# Patient Record
Sex: FEMALE | Race: BLACK OR AFRICAN AMERICAN | NOT HISPANIC OR LATINO | Employment: OTHER | URBAN - METROPOLITAN AREA
[De-identification: names, ages, dates, MRNs, and addresses within clinical notes are randomized per-mention and may not be internally consistent; named-entity substitution may affect disease eponyms.]

---

## 2017-02-06 ENCOUNTER — PATIENT OUTREACH (OUTPATIENT)
Dept: ADMINISTRATIVE | Facility: HOSPITAL | Age: 52
End: 2017-02-06

## 2017-05-02 RX ORDER — NAPROXEN SODIUM 220 MG
TABLET ORAL
Qty: 90 EACH | Refills: 1 | Status: SHIPPED | OUTPATIENT
Start: 2017-05-02 | End: 2022-07-22

## 2017-07-10 DIAGNOSIS — Z12.11 COLON CANCER SCREENING: ICD-10-CM

## 2017-08-01 ENCOUNTER — OFFICE VISIT (OUTPATIENT)
Dept: ENDOCRINOLOGY | Facility: CLINIC | Age: 52
End: 2017-08-01
Payer: MEDICARE

## 2017-08-01 VITALS
BODY MASS INDEX: 25.97 KG/M2 | HEIGHT: 62 IN | WEIGHT: 141.13 LBS | DIASTOLIC BLOOD PRESSURE: 90 MMHG | SYSTOLIC BLOOD PRESSURE: 140 MMHG | HEART RATE: 86 BPM

## 2017-08-01 DIAGNOSIS — E78.2 MIXED HYPERLIPIDEMIA: Chronic | ICD-10-CM

## 2017-08-01 DIAGNOSIS — I10 ESSENTIAL HYPERTENSION, BENIGN: Chronic | ICD-10-CM

## 2017-08-01 LAB
CREAT UR-MCNC: 133 MG/DL
MICROALBUMIN UR DL<=1MG/L-MCNC: 3806 UG/ML
MICROALBUMIN/CREATININE RATIO: 2861.7 UG/MG

## 2017-08-01 PROCEDURE — 99214 OFFICE O/P EST MOD 30 MIN: CPT | Mod: GC,S$GLB,, | Performed by: INTERNAL MEDICINE

## 2017-08-01 PROCEDURE — 99999 PR PBB SHADOW E&M-EST. PATIENT-LVL IV: CPT | Mod: PBBFAC,GC,, | Performed by: INTERNAL MEDICINE

## 2017-08-01 PROCEDURE — 82570 ASSAY OF URINE CREATININE: CPT

## 2017-08-01 RX ORDER — INSULIN LISPRO 100 [IU]/ML
INJECTION, SOLUTION INTRAVENOUS; SUBCUTANEOUS
Qty: 30 ML | Refills: 11 | Status: SHIPPED | OUTPATIENT
Start: 2017-08-01 | End: 2018-07-13 | Stop reason: SDUPTHER

## 2017-08-01 NOTE — PROGRESS NOTES
"Subjective:      Patient ID: Estefani Sutherland is a 52 y.o. female.    Chief Complaint: Diabetes Mellitus     is presenting for new evaluation and treatment of dm type 2.    Other med history includes stroke in May 2015, hyperlipidemia  Diagnosed with DM type 2 at age 27.  Currently on levemir 50 units 1-2x per day. Mostly 1x per day. Reports that she takes more insulin if her blood is elevated.  Dm complications include retinopathy, neuropathy.  Has had laser surgery.    htn -- currently takes lisinopril, amlodipine  hld - takes pravastatin 40mg    Checks bg 2-3x per day  Am fastin's  Before bed: 300's    Lives with   No formal exercise    Dad with diabetes. Didn't develop diabetes until age 79  Aunt with diabetes    Review of Systems   Constitutional: Negative for unexpected weight change.   Eyes: Positive for visual disturbance.   Respiratory: Negative for shortness of breath.    Cardiovascular: Negative for chest pain.   Gastrointestinal: Negative for abdominal pain.   Musculoskeletal: Negative for arthralgias.   Skin: Negative for wound.   Neurological: Positive for numbness. Negative for headaches.   Hematological: Does not bruise/bleed easily.   Psychiatric/Behavioral: Negative for sleep disturbance.       Objective:     BP (!) 140/90 (BP Location: Left arm, Patient Position: Sitting, BP Method: Manual)   Pulse 86   Ht 5' 2" (1.575 m)   Wt 64 kg (141 lb 1.5 oz)   BMI 25.81 kg/m²      Physical Exam   Constitutional: She appears well-developed.   HENT:   Right Ear: External ear normal.   Left Ear: External ear normal.   Nose: Nose normal.   Hearing normal  Dentition good    Neck: No tracheal deviation present. No thyromegaly present.   Cardiovascular: Normal rate.    No murmur heard.  Pulmonary/Chest: Effort normal and breath sounds normal.   Abdominal: Soft. There is no tenderness. No hernia.   Musculoskeletal: She exhibits no edema.        Right foot: There is no deformity.        Left " foot: There is no deformity.   Gait normal  No clubbing or cyanosis noted   Feet:   Right Foot:   Skin Integrity: Negative for ulcer.   Left Foot:   Skin Integrity: Negative for ulcer.   Neurological: No cranial nerve deficit.   Feet - decreased sensation intact to vibration and monofilament     Skin: No rash noted.   No nodules  injection sites are ok. No lipo hypertropthy or atrophy     Psychiatric: She has a normal mood and affect. Judgment normal.   Vitals reviewed.      Assessment:     1. DM type 2, uncontrolled, with neuropathy    2. Essential hypertension, benign    3. Mixed hyperlipidemia        Plan:   1. Will try to order v-go 30, 6 units (3 clicks with meals). Plan to write through [facility] in obtaining approval. Diabetes education to discuss how to use v-go. Check urine microalbumin/creatinine ratio today. Obtain outside recent July labs and office notes from [facility].  2. bp at goal. Continue lisinopril 10mg  3. Continue pravastatin 40mg    Discussed with Dr. Steve  Contact [phone]  Follow-up with Endocrine NP in 6 weeks    Bhupinder Gallegos MD

## 2017-08-02 ENCOUNTER — OFFICE VISIT (OUTPATIENT)
Dept: PODIATRY | Facility: CLINIC | Age: 52
End: 2017-08-02
Payer: MEDICARE

## 2017-08-02 VITALS
DIASTOLIC BLOOD PRESSURE: 94 MMHG | HEART RATE: 86 BPM | WEIGHT: 141 LBS | HEIGHT: 62 IN | SYSTOLIC BLOOD PRESSURE: 158 MMHG | BODY MASS INDEX: 25.95 KG/M2

## 2017-08-02 DIAGNOSIS — L84 CORN OR CALLUS: ICD-10-CM

## 2017-08-02 DIAGNOSIS — L60.2 ONYCHAUXIS: ICD-10-CM

## 2017-08-02 DIAGNOSIS — E11.42 DIABETIC POLYNEUROPATHY ASSOCIATED WITH TYPE 2 DIABETES MELLITUS: Primary | Chronic | ICD-10-CM

## 2017-08-02 PROCEDURE — 99999 PR PBB SHADOW E&M-EST. PATIENT-LVL III: CPT | Mod: PBBFAC,,, | Performed by: PODIATRIST

## 2017-08-02 PROCEDURE — 99202 OFFICE O/P NEW SF 15 MIN: CPT | Mod: 25,S$GLB,, | Performed by: PODIATRIST

## 2017-08-02 PROCEDURE — 11055 PARING/CUTG B9 HYPRKER LES 1: CPT | Mod: Q9,S$GLB,, | Performed by: PODIATRIST

## 2017-08-02 PROCEDURE — 11719 TRIM NAIL(S) ANY NUMBER: CPT | Mod: 59,Q9,S$GLB, | Performed by: PODIATRIST

## 2017-08-02 NOTE — PROGRESS NOTES
Ms. Sutherland is a 52 year old woman with type 2 diabetes that is uncontrolled on MDI, would like to make administration of insulin as easy as possible. Considering v-go pump, but need to see recent blood tests to be able to decide insulin needs given possibility of CKD.     Have explained this at length to Ms. Sutherland.     I, Selam Steve, have personally taken the history and physical exam and I agree with Dr. Gallegos's assessment and plan

## 2017-08-02 NOTE — PATIENT INSTRUCTIONS
1. Remember the importance of good nutrition and blood sugar control to help prevent foot complications of diabetes and see your internist at least ever six months.     2. Always wear proper shoe gear.     3. Inspect your feet daily.     4. Never put sharp instruments to your feet.     5. Be faithful about your routine podiatric nail visits every 2 - 3 months.     6. Always call the clinic immediately if you notice something on your feet that is not normal such as a blister, wound, or foreign object stuck in your foot.

## 2017-08-02 NOTE — LETTER
August 2, 2017      Meg Mcdaniels MD  [address] [facility] [address]           [facility] - Podiatry  [address]  [facility] [address]  Phone: [phone]          Patient: Estefani Sutherland   MR Number: [id number]   YOB: 1965   Date of Visit: 8/2/2017       Dear Dr. Meg Mcdaniels:    Thank you for referring Estefani Sutherland to me for evaluation. Attached you will find relevant portions of my assessment and plan of care.    If you have questions, please do not hesitate to call me. I look forward to following Estefani Sutherland along with you.    Sincerely,    Aliya Garcia DPM    Enclosure  CC:  No Recipients    If you would like to receive this communication electronically, please contact [email or web address] or [phone] to request more information on [employer] Link access.    For providers and/or their staff who would like to refer a patient to [facility], please contact us through our one-stop-shop provider referral line, [employer] Jan, at [phone].    If you feel you have received this communication in error or would no longer like to receive these types of communications, please e-mail [email or web address]

## 2017-08-02 NOTE — PROGRESS NOTES
Subjective:      Patient ID: Estefani Sutherland is a 52 y.o. female.    Chief Complaint: Diabetes Mellitus ( queta Gallegos 8/1/17); Diabetic Foot Exam; Numbness; and Callouses (rt 2nd toe)    Estefani is a 52 y.o. female who presents to the clinic for evaluation and treatment of high risk feet. Estefani has a past medical history of Diabetes mellitus type II; Diabetic neuropathy; Diabetic retinopathy; Diabetic retinopathy associated with type 2 diabetes mellitus; Hyperlipidemia; Hypertension; Neuromuscular disorder; and Stroke. The patient's chief complaint is long, thick toenails and callus R 2nd toe. This patient has documented high risk feet requiring routine maintenance secondary to diabetes mellitis and those secondary complications of diabetes, as mentioned.    PCP: Roverto Armendariz MD    Date Last Seen by PCP:   Chief Complaint   Patient presents with    Diabetes Mellitus      queta Gallegos 8/1/17    Diabetic Foot Exam    Numbness    Callouses     rt 2nd toe        Current shoe gear:  Affected Foot: Casual shoes     Unaffected Foot: Casual shoes    Past Medical History:   Diagnosis Date    Diabetes mellitus type II     Diabetic neuropathy     Diabetic retinopathy     Diabetic retinopathy associated with type 2 diabetes mellitus     Hyperlipidemia     Hypertension     Neuromuscular disorder     neuropathy - feet and hands    Stroke     Ischemic R MCA 5/2015      Past Surgical History:   Procedure Laterality Date    BREAST SURGERY      breast reduction    HYSTERECTOMY      BSO      Family History   Problem Relation Age of Onset    Hypertension Mother     Diabetes Father     Hypertension Father      Social History     Social History    Marital status:      Spouse name: N/A    Number of children: N/A    Years of education: N/A     Social History Main Topics    Smoking status: Never Smoker    Smokeless tobacco: None    Alcohol use No    Drug use: No    Sexual activity: Yes      Partners: Male     Other Topics Concern    None     Social History Narrative    None         Hemoglobin A1C   Date Value Ref Range Status   09/25/2015 11.3 (H) 4.5 - 6.2 % Final   05/29/2015 12.3 (H) 4.5 - 6.2 % Final   04/15/2014 13.8 (H) 4.5 - 6.2 % Final       Review of Systems   Eyes:        Visual impairment.   Cardiovascular: Negative for chest pain.   Respiratory: Negative for shortness of breath.    Musculoskeletal: Positive for back pain (upper back). Negative for joint pain and joint swelling.   Neurological: Positive for numbness (in feet) and paresthesias.   Psychiatric/Behavioral: Negative for altered mental status.           Objective:      Physical Exam   Constitutional: She is oriented to person, place, and time. She appears well-developed and well-nourished. No distress.   Cardiovascular: Normal rate and intact distal pulses.    Dorsalis pedis pulses are palpable Bilateral and posterior tibial pulses are non-palpable Bilateral. Toes are cool to touch. Feet are warm proximally.There is decreased digital hair. Skin is atrophic, slightly hyperpigmented, and mildly edematous.   Musculoskeletal: Normal range of motion. She exhibits edema (mild in feet). She exhibits no tenderness.   Neurological: She is alert and oriented to person, place, and time. She exhibits normal muscle tone.   [place]-Donell 5.07 monofilamant testing is absent on toes Hipolito feet. Sharp/dull sensation absent on toes Bilaterally.    Skin: Skin is warm and dry. No rash noted. She is not diaphoretic. No erythema. No pallor.   Toenails 1-5 bilaterally are elongated by 2-3 mm, thickened by 1-2 mm, mildly discolored/yellowed nails.    Hyperkeratotic lesions at the following locations: R second toe     Psychiatric: She has a normal mood and affect. Her behavior is normal. Judgment and thought content normal.   Nursing note and vitals reviewed.            Assessment:       Encounter Diagnoses   Name Primary?    Diabetic polyneuropathy  associated with type 2 diabetes mellitus Yes    Onychauxis     Corn or callus    This patient has documented high risk feet requiring routine maintenance secondary to diabetes mellitis and those secondary complications of diabetes, as mentioned.        Plan:       Estefani was seen today for diabetes mellitus, diabetic foot exam, numbness and callouses.    Diagnoses and all orders for this visit:    Diabetic polyneuropathy associated with type 2 diabetes mellitus  -     DIABETIC SHOES FOR HOME USE    Onychauxis    Corn or callus    - Patient was given written and verbal instructions regarding foot condition.  I counseled the patient on her conditions, their implications and medical management.    - Shoe inspection. Diabetic Foot Education. Patient reminded of the importance of good nutrition and blood sugar control to help prevent podiatric complications of diabetes. Patient instructed on proper foot hygeine. We discussed wearing proper shoe gear, daily foot inspections, never walking without protective shoe gear, never putting sharp instruments to feet, routine podiatric nail visits every 2-3 months.      - With patient's permission, nails were aggressively reduced and trimmed x 10 to their soft tissue attachment mechanically with nippers, removing all offending nail and debris. Utilizing a #15 scalpel, I trimmed the corns and calluses at the following locations: R second toe. Patient tolerated well and no blood was drawn.  Patient relates relief following the procedure. She will continue to monitor the areas daily, inspect her feet, wear protective shoe gear when ambulatory, moisturizer to maintain skin integrity and follow in this office in approximately 2-3 months, sooner p.r.n.

## 2017-08-04 DIAGNOSIS — Z12.11 COLON CANCER SCREENING: ICD-10-CM

## 2017-08-05 ENCOUNTER — TELEPHONE (OUTPATIENT)
Dept: ENDOCRINOLOGY | Facility: CLINIC | Age: 52
End: 2017-08-05

## 2017-09-22 ENCOUNTER — TELEPHONE (OUTPATIENT)
Dept: INTERNAL MEDICINE | Facility: CLINIC | Age: 52
End: 2017-09-22

## 2017-10-11 RX ORDER — NAPROXEN SODIUM 220 MG
TABLET ORAL
Qty: 90 EACH | Refills: 0 | OUTPATIENT
Start: 2017-10-11

## 2017-10-30 ENCOUNTER — TELEPHONE (OUTPATIENT)
Dept: ENDOCRINOLOGY | Facility: CLINIC | Age: 52
End: 2017-10-30

## 2017-10-30 NOTE — TELEPHONE ENCOUNTER
----- Message from Lauren Wolf sent at 10/30/2017 10:05 AM CDT -----  Contact: patient  Patient needs to speak with the nurse about a Diabetic education class, patient insists she has an appointment 11//07/17.  Please call patient to discuss at [phone]

## 2017-11-01 ENCOUNTER — OFFICE VISIT (OUTPATIENT)
Dept: PODIATRY | Facility: CLINIC | Age: 52
End: 2017-11-01
Payer: MEDICARE

## 2017-11-01 VITALS
HEART RATE: 88 BPM | DIASTOLIC BLOOD PRESSURE: 92 MMHG | HEIGHT: 62 IN | SYSTOLIC BLOOD PRESSURE: 143 MMHG | BODY MASS INDEX: 26.29 KG/M2 | WEIGHT: 142.88 LBS

## 2017-11-01 DIAGNOSIS — L84 CORN OR CALLUS: ICD-10-CM

## 2017-11-01 DIAGNOSIS — E11.42 DIABETIC POLYNEUROPATHY ASSOCIATED WITH TYPE 2 DIABETES MELLITUS: Primary | ICD-10-CM

## 2017-11-01 DIAGNOSIS — B35.1 DERMATOPHYTOSIS OF NAIL: ICD-10-CM

## 2017-11-01 PROCEDURE — 11721 DEBRIDE NAIL 6 OR MORE: CPT | Mod: 59,Q9,S$GLB, | Performed by: PODIATRIST

## 2017-11-01 PROCEDURE — 99214 OFFICE O/P EST MOD 30 MIN: CPT | Mod: 25,S$GLB,, | Performed by: PODIATRIST

## 2017-11-01 PROCEDURE — 99999 PR PBB SHADOW E&M-EST. PATIENT-LVL III: CPT | Mod: PBBFAC,,, | Performed by: PODIATRIST

## 2017-11-01 PROCEDURE — 11055 PARING/CUTG B9 HYPRKER LES 1: CPT | Mod: Q9,S$GLB,, | Performed by: PODIATRIST

## 2017-11-01 RX ORDER — LISINOPRIL 40 MG/1
TABLET ORAL
Refills: 3 | COMMUNITY
Start: 2017-08-06 | End: 2019-08-19

## 2017-11-01 RX ORDER — FLUCONAZOLE 150 MG/1
TABLET ORAL
Refills: 0 | COMMUNITY
Start: 2017-07-29

## 2017-11-01 RX ORDER — LISINOPRIL AND HYDROCHLOROTHIAZIDE 12.5; 2 MG/1; MG/1
TABLET ORAL
Refills: 0 | COMMUNITY
Start: 2017-10-23 | End: 2019-08-19

## 2017-11-01 RX ORDER — INSULIN DETEMIR 100 [IU]/ML
INJECTION, SOLUTION SUBCUTANEOUS
Refills: 0 | COMMUNITY
Start: 2017-09-27 | End: 2019-08-19

## 2017-11-01 RX ORDER — METFORMIN HYDROCHLORIDE 500 MG/1
TABLET, EXTENDED RELEASE ORAL
Refills: 5 | COMMUNITY
Start: 2017-08-24 | End: 2019-08-19

## 2017-11-01 RX ORDER — INSULIN DETEMIR 100 [IU]/ML
INJECTION, SOLUTION SUBCUTANEOUS
Refills: 5 | COMMUNITY
Start: 2017-10-18 | End: 2019-08-19

## 2017-11-01 RX ORDER — ATORVASTATIN CALCIUM 40 MG/1
TABLET, FILM COATED ORAL
Refills: 5 | COMMUNITY
Start: 2017-10-23 | End: 2022-08-19

## 2017-11-01 RX ORDER — AMLODIPINE BESYLATE 10 MG/1
TABLET ORAL
Refills: 3 | COMMUNITY
Start: 2017-10-16

## 2017-11-01 NOTE — PROGRESS NOTES
Subjective:      Patient ID: Estefani Sutherland is a 52 y.o. female.    Chief Complaint: PCP (Kala 0925/2015); Diabetic Foot Exam; and Nail Care    Estefani is a 52 y.o. female who presents to the clinic for evaluation and treatment of high risk feet. Estefani has a past medical history of Diabetes mellitus type II; Diabetic neuropathy; Diabetic retinopathy; Diabetic retinopathy associated with type 2 diabetes mellitus; Hyperlipidemia; Hypertension; Neuromuscular disorder; and Stroke. The patient's chief complaint is long, thick toenails and callus sub 5th metatarsal head left foot.   This patient has documented high risk feet requiring routine maintenance secondary to diabetes mellitis and those secondary complications of diabetes, as mentioned.    She is wearing [employer]ox shoes today.      PCP: Roverto Armendariz MD    Date Last Seen by PCP:   Chief Complaint   Patient presents with    PCP     [facility]  Dr. Mcdaniels  Sept 10, 2017;   Dr. Steve Endocrinology 8/1/17    Diabetic Foot Exam    Nail Care          Past Medical History:   Diagnosis Date    Diabetes mellitus type II     Diabetic neuropathy     Diabetic retinopathy     Diabetic retinopathy associated with type 2 diabetes mellitus     Hyperlipidemia     Hypertension     Neuromuscular disorder     neuropathy - feet and hands    Stroke     Ischemic R MCA 5/2015      Past Surgical History:   Procedure Laterality Date    BREAST SURGERY      breast reduction    HYSTERECTOMY      BSO        Current Outpatient Prescriptions on File Prior to Visit   Medication Sig Dispense Refill    amlodipine (NORVASC) 5 MG tablet Take 1 tablet (5 mg total) by mouth once daily. (Patient taking differently: Take 10 mg by mouth once daily. ) 90 tablet 0    aspirin (ECOTRIN) 81 MG EC tablet Take 81 mg by mouth once daily.      blood sugar diagnostic Strp Check sugar before meals tid and as directed 300 each 3    fluticasone (FLONASE) 50 mcg/actuation nasal spray 2 sprays by  "Each Nare route once daily. 16 g 1    insulin lispro (HUMALOG) 100 unit/mL injection Inject subcutaneously daily per V-go. Take 6 units (3 clicks) with meals 30 mL 11    insulin needles, disposable, 30 x 1/3 " Ndle Use as directed tid 100 each 5    insulin syringe-needle U-100 0.5 mL 31 gauge x 5/16 Syrg USE TWICE DAILY AS DIRECTED 90 each 1    lisinopril 10 MG tablet Take 2 tablets (20 mg total) by mouth once daily. (Patient taking differently: Take 40 mg by mouth once daily. ) 180 tablet 0    MICROLET LANCET [facility] CHECK SUGAR BEFORE MEALS THREE TIMES DAILY AND AS DIRECTED 300 each 5    needle, disp, 21 G (BD REGULAR BEVEL NEEDLES) 21 x 1 " Ndle 1 Stick by Misc.(Non-Drug; Combo Route) route as directed. 100 each 0    pregabalin (LYRICA) 50 MG capsule Take 1 capsule (50 mg total) by mouth once daily. 90 capsule 0    sub-q insulin device, 30 unit [place] 1 each by Misc.(Non-Drug; Combo Route) route once daily. 30 Device 11    blood-glucose meter kit Use as instructed 1 each 0    [DISCONTINUED] pravastatin (PRAVACHOL) 40 MG tablet Take 1 tablet (40 mg total) by mouth once daily. 90 tablet 0     No current facility-administered medications on file prior to visit.            Family History   Problem Relation Age of Onset    Hypertension Mother     Diabetes Father     Hypertension Father      Social History     Social History    Marital status:      Spouse name: N/A    Number of children: N/A    Years of education: N/A     Social History Main Topics    Smoking status: Never Smoker    Smokeless tobacco: Not on file    Alcohol use No    Drug use: No    Sexual activity: Yes     Partners: Male     Other Topics Concern    Not on file     Social History Narrative    No narrative on file       Review of patient's allergies indicates:  No Known Allergies      Hemoglobin A1C   Date Value Ref Range Status   09/25/2015 11.3 (H) 4.5 - 6.2 % Final   05/29/2015 12.3 (H) 4.5 - 6.2 % Final   04/15/2014 13.8 (H) " "4.5 - 6.2 % Final       Review of Systems   Eyes:        Visual impairment.   Cardiovascular: Negative for chest pain.   Respiratory: Negative for shortness of breath.    Musculoskeletal: Positive for back pain (upper back). Negative for joint pain and joint swelling.   Neurological: Positive for numbness (in feet) and paresthesias.   Psychiatric/Behavioral: Negative for altered mental status.           Objective:       Vitals:    11/01/17 0727   BP: (!) 143/92   Pulse: 88   Weight: 64.8 kg (142 lb 14.4 oz)   Height: 5' 2" (1.575 m)           Physical Exam   Constitutional: She is oriented to person, place, and time. She appears well-developed and well-nourished. No distress.   Cardiovascular: Normal rate and intact distal pulses.    Dorsalis pedis pulses are palpable Bilateral and posterior tibial pulses are non-palpable Bilateral. Toes are cool to touch. Feet are warm proximally.There is decreased digital hair. Skin is atrophic, slightly hyperpigmented, and mildly edematous.   Musculoskeletal: Normal range of motion. She exhibits edema (mild in feet). She exhibits no tenderness.   Neurological: She is alert and oriented to person, place, and time. She exhibits normal muscle tone.   [place]ell 5.07 monofilamant testing is absent on toes Hipolito feet. Sharp/dull sensation absent on toes Bilaterally.    Skin: Skin is warm and dry. No rash noted. She is not diaphoretic. No erythema. No pallor.   Toenails 1-5 bilaterally are elongated by 2-3 mm, thickened by 1mm, mildly discolored/yellowed nails.    Hyperkeratotic lesions at the following locations: sub 5th metatarsal head left foot.  No wounds.      Psychiatric: She has a normal mood and affect. Her behavior is normal. Judgment and thought content normal.   Nursing note and vitals reviewed.            Assessment:       Encounter Diagnoses   Name Primary?    Diabetic polyneuropathy associated with type 2 diabetes mellitus Yes    Corn or callus     Dermatophytosis " of nail    This patient has documented high risk feet requiring routine maintenance secondary to diabetes mellitis and those secondary complications of diabetes, as mentioned.        Plan:       Estefani was seen today for pcp, diabetic foot exam and nail care.    Diagnoses and all orders for this visit:    Diabetic polyneuropathy associated with type 2 diabetes mellitus  -     DIABETIC SHOES FOR HOME USE  -     menthol (BIOFREEZE, MENTHOL,) 4 % Gel; Apply 1 application topically once daily. To painful area on the feet.    Corn or callus    Dermatophytosis of nail    Other orders  -     Foot Care    - Patient was given written and verbal instructions regarding foot condition.  I counseled the patient on her conditions, their implications and medical management.    - Shoe inspection. Diabetic Foot Education. Patient reminded of the importance of good nutrition and blood sugar control to help prevent podiatric complications of diabetes. Patient instructed on proper foot hygeine. We discussed wearing proper shoe gear, daily foot inspections, never walking without protective shoe gear, never putting sharp instruments to feet, routine podiatric nail visits every 2-3 months.     Routine Foot Care  Date/Time: 11/1/2017 7:47 AM  Performed by: SASHA MURILLO  Authorized by: SASHA MURILLO     Consent Done?:  Yes (Verbal)  Hyperkeratotic Skin Lesions?: Yes    Number of trimmed lesions:  1  Location(s):  Left 5th Metatarsal Head    Nail Care Type:  Debride  Location(s): All  (Left 1st Toe, Left 3rd Toe, Left 2nd Toe, Left 4th Toe, Left 5th Toe, Right 1st Toe, Right 2nd Toe, Right 3rd Toe, Right 4th Toe and Right 5th Toe)  Patient tolerance:  Patient tolerated the procedure well with no immediate complications     With patient's permission, the toenails mentioned above were aggressively reduced and debrided using a nail nipper, removing all offending nail and debris. Utilizing a #15 scalpel, I trimmed the corns and calluses at the  above mentioned location.      The patient will continue to monitor the areas daily, inspect the feet, wear protective shoe gear when ambulatory, and moisturizer to maintain skin integrity.       Return in about 3 months (around 2/1/2018) for foot care, or sooner if concerned.

## 2017-11-10 ENCOUNTER — TELEPHONE (OUTPATIENT)
Dept: DIABETES | Facility: CLINIC | Age: 52
End: 2017-11-10

## 2017-11-10 NOTE — TELEPHONE ENCOUNTER
Received message that pt wants to reschedule Diabetes education.  Left message for pt to return call.  Contact information provided.

## 2017-12-06 RX ORDER — INSULIN DETEMIR 100 [IU]/ML
INJECTION, SOLUTION SUBCUTANEOUS
Qty: 20 ML | Refills: 0 | OUTPATIENT
Start: 2017-12-06

## 2017-12-15 ENCOUNTER — PATIENT OUTREACH (OUTPATIENT)
Dept: DIABETES | Facility: CLINIC | Age: 52
End: 2017-12-15

## 2017-12-15 ENCOUNTER — TELEPHONE (OUTPATIENT)
Dept: ENDOCRINOLOGY | Facility: CLINIC | Age: 52
End: 2017-12-15

## 2017-12-15 ENCOUNTER — TELEPHONE (OUTPATIENT)
Dept: DIABETES | Facility: CLINIC | Age: 52
End: 2017-12-15

## 2017-12-15 NOTE — TELEPHONE ENCOUNTER
----- Message from Yarelis Pruett RD sent at 12/15/2017 10:49 AM CST -----  Hi!    I received a message from pre-service that the diabetes education appt for VGo training this Monday, 12/18 was denied, so I called and talked with Ms. Koko. She contacted her insurance and called me back stating insurance instructed her they need MD prior authorization and requested Dr. Gallegos call them at [phone] to complete the auth. Please advise.     Thank you!  Yarelis

## 2017-12-18 ENCOUNTER — CLINICAL SUPPORT (OUTPATIENT)
Dept: DIABETES | Facility: CLINIC | Age: 52
End: 2017-12-18
Payer: MEDICARE

## 2017-12-18 PROCEDURE — G0108 DIAB MANAGE TRN  PER INDIV: HCPCS | Mod: S$GLB,,, | Performed by: DIETITIAN, REGISTERED

## 2017-12-18 NOTE — PROGRESS NOTES
Diabetes Education  Author: Yarelis Pruett RD  Date: 12/18/2017    Diabetes Education Visit  Diabetes Education Record Assessment/Progress: Initial (Here for VGo training)    Diabetes Type  Diabetes Type : Type II    Diabetes History  Diabetes Diagnosis: >10 years    Nutrition  Meal Planning: 3 meals per day, snacks between meal    Monitoring   Self Monitoring : SMBG ~2 times daily  Blood Glucose Logs: No    Exercise   Exercise Type:  (has treadmill at home - reports when BG is high, she runs on it for 3 minutes)    Current Diabetes Treatment   Current Treatment: Insulin (Metformin XR 1000mg BID. Has been on Levemir 50 units BID (often only taking once daily). Now changing to VG0 30 with 3 clicks before each meal)    Social History  Preferred Learning Method: Face to Face, Hands On  Primary Support: Self, Spouse ( attended visit today)            DDS-2 Score  ( > 3 = SIGNIFICANT DISTRESS): 2                   Barriers to Change  Barriers to Change: None  Learning Challenges : Vision  Vision - further explanation: blind (legally blind - has partial vision with right eye, also reports having cataracts removed in about 6 weeks)    Readiness to Learn   Readiness to Learn : Acceptance    Cultural Influences  Cultural Influences: No    Diabetes Education Assessment/Progress    Diabetes Disease Process (diabetes disease process and treatment options): Discussion, Individual Session, Demonstrates Understanding/Competency(verbalizes/demonstrates)    Nutrition (Incorporating nutritional management into one's lifestyle): Discussion, Individual Session, Needs Review, Instructed, Written Materials Provided (Verbalized would like review of nutrition. Discussed sources of CHO, serving sizes, and plate method of meal planning. Reviewed portion control with food models. Encouraged 3 meals daily with 30-45g CHO/meal and if hungry between meals, 0-5g CHO snack.)    Physical Activity (incorporating physical activity into one's  "lifestyle): Discussion, Individual Session, Needs Review, Written Materials Provided, Instructed (She verbalized not trying to lose wt but wants to increase muscle tone. Discussed benefits of physical activity. She has treadmill at home but does not use it regularly. Encouraged starting with 10 min and working up to 30 min 5 days weekly. )    Medications (states correct name, dose, onset, peak, duration, side effects & timing of meds): Demonstration, Discussion, Return Demonstration, Needs Instruction, Individual Session, Instructed, Written Materials Provided (Provided VGo training. Pt had some difficulty at first - became tearful stating "I can't see how to do it." Provided reassurance that we can do multiple demonstrations with her feeling her way through the steps until she is comfortable with it. She successfully performed return demo with a demo device and her first device in office today.  observed and assisted with filling and placing Vgo.     Patient is here for instructions on use of the V-Go 30 which will provide 30 units of basal insulin given over 24 hours (1.25 units/hr) and up to 36 units of on-demand bolus dosing in 2-Unit increments. Patient will be giving 6 of Novolog at each meal (3 clicks). Patient had been instructed to hold long-acting insulin. Patient also advised will discontinue taking insulin pens and only use the Novolog vial with the V-Go system.     Patient was instructed on the following:    Insert vial into EZ Fill and leave in place until vial is empty   Remove plug and insert V-Go    Draw insulin into EZ Fill and fill V-Go with insulin (check that V-Go is full of insulin)    Remove V-GO and clean and replace plug (advised to wipe the circular opening with an alcohol swab before replacing)    Select site for V-Go (site selection reviewed) and prepare infusion site with aseptic technique    Remove button cover and adhesive liner    Attach V-Go to site selected and insert " needle by pushing needle button in to activate basal rate    Instructed patient on how to activate the bolus ready button and to push the bolus delivery button into the V-Go to deliver 2 units of insulin (1 click = 2 units). Patient advised that once all 36 units of the on-demand bolus have been given, the bolus buttons will lock, but the basal insulin will continue for the remainder of the 24 hours    To remove, release needle by sliding and pressing the needle release button    Remove the V-Go and discard (the V-Go is 100% disposable)   Patient instructed that the V-Go needs to be changed every 24 hours.    Patient was able to perform successful return demonstration of all.     General precautions reviewed with the patient:    V-Go needs to be removed before having an X-ray, MRI or CT scan (or any similar test or procedure). Replace with a new V-Go after the test or procedure is completed. Patient also advised to check with provider before any type of surgical procedure to see if the V-Go can be worn.    Patient advised to monitor BG 4 times a day (pre-meals and at HS)    Patient advised to keep back up insulin pens (non-) should a problem develop with the V-Go. Patient also advised should have directions from endocrine provider regarding insulin doses should need arise to switch back to insulin pens temporarily.    The V-Go should not be exposed to direct sunlight, hot tub, whirlpool or sauna use (replace with a new filled V-Go afterward)    Check that the V-Go is securely in place during and after periods of increased physical activity, exposure to water or gone under water to the depth of 3 feet, 3 inches (the V-Go can go under water and continue to work safely)    Reviewed s/s and tx of hypoglycemia    All of patient's questions and concerns were addressed. Patient instructed to keep a BG log (log sheets provided) and send BG readings to endocrine provider in 1 week for review. Phone number  "was provided and patient advised to call with any questions or concerns. )    Monitoring (monitoring blood glucose/other parameters & using results): Discussion, Needs Review, Individual Session, Instructed, Written Materials Provided (Reviewed goal BG readings, SMBG 4 times daily AC/HS, keeping log and send log in 1 week for review. Provided addressed envelops for sending in logs.)    Acute Complications (preventing, detecting, and treating acute complications): Discussion, Needs Review, Individual Session, Instructed, Written Materials Provided (Reviewed s/s and proper treatment of hypoglycemia with "rule of 15.")    Chronic Complications (preventing, detecting, and treating chronic complications): Discussion, Individual Session, Demonstrates Understanding/Competency (verbalizes/demonstrates) (Reviewed standards of care. )    Cognitive (knowledge of self-management skills, functional health literacy): Discussion, Individual Session    Psychosocial (emotional response to diabetes): Discussion, Individual Session    Diabetes Distress and Support Systems: Discussion, Individual Session    Behavioral (readiness for change, lifestyle practices, self-care behaviors): Discussion, Individual Session    Goals  Patient has selected/evaluated goals during today's session: Yes, selected  Monitoring: Set (Will send log in 1 week. )  Start Date: 12/18/17  Target Date: 03/18/18  Medications: Set (Will change VGo same time daily and provided clicks before each meal. )  Start Date: 12/18/17  Target Date: 03/18/18         Diabetes Care Plan/Intervention  Education Plan/Intervention: Other (Pt to send log in mail in 1 week. Unable to schedule follow-up DE appt at this time d/t insurance denial. Provided contact information and pt will call when ready. )    Diabetes Meal Plan  Carbohydrate Per Meal: 30-45g    Education Units of Time   Time Spent: 60 min      Health Maintenance Topics with due status: Not Due       Topic Last " Completion Date    Pneumococcal PPSV23 (Medium Risk) 04/16/2013    Foot Exam 09/12/2017     Health Maintenance Due   Topic Date Due    TETANUS VACCINE  04/12/1982    Colonoscopy  04/12/2014    Mammogram  03/28/2015    Hemoglobin A1c  03/25/2016    Lipid Panel  05/29/2016    Eye Exam  10/19/2016    Influenza Vaccine  08/01/2017

## 2017-12-18 NOTE — Clinical Note
Arnold Gallegos,   MsLillian Koko successfully started VGo 30 today. I provided her log sheets and addressed envelops to mail in 1 week.  Thank you! Yarelis

## 2018-04-19 ENCOUNTER — TELEPHONE (OUTPATIENT)
Dept: ENDOCRINOLOGY | Facility: CLINIC | Age: 53
End: 2018-04-19

## 2018-04-19 NOTE — TELEPHONE ENCOUNTER
----- Message from Alexandra Hernández sent at 4/19/2018  9:56 AM CDT -----  Contact: Walter'  Would like the- Humolog to be re-written as 3 vials(each) for 30 days.          ..  [facility] [address] [place] [address] [address] [facility] [address] [facility] [address]  [address] [facility] [address]  [facility] [address]  Phone: [phone] Fax: [phone]

## 2018-07-13 RX ORDER — INSULIN LISPRO 100 [IU]/ML
INJECTION, SOLUTION INTRAVENOUS; SUBCUTANEOUS
Qty: 30 ML | Refills: 3 | Status: SHIPPED | OUTPATIENT
Start: 2018-07-13 | End: 2018-09-10 | Stop reason: SDUPTHER

## 2018-07-13 NOTE — TELEPHONE ENCOUNTER
----- Message from Lazara Alonzo sent at 7/13/2018  4:47 PM CDT -----  Contact: Pt     [phone]  Rx Refill/Request     Is this a Refill or New Rx:  Refill    Rx Name and Strength:  insulin lispro (HUMALOG) 100 unit/mL injection  Preferred Pharmacy with phone number:    [facility]   [phone]   Communication Preference:   Phone   Additional Information:

## 2018-09-10 RX ORDER — INSULIN LISPRO 100 [IU]/ML
INJECTION, SOLUTION INTRAVENOUS; SUBCUTANEOUS
Qty: 30 ML | Refills: 3 | Status: SHIPPED | OUTPATIENT
Start: 2018-09-10 | End: 2019-08-19 | Stop reason: SDUPTHER

## 2018-09-10 NOTE — TELEPHONE ENCOUNTER
----- Message from Lazara Alonzo sent at 9/10/2018  1:26 PM CDT -----  Contact:   Pt  [phone]  Rx Refill/Request     Is this a Refill or New Rx:    Refill     Rx Name and Strength:    HUMALOG) 100 unit/mL injection,  VGO Kit   Preferred Pharmacy with phone number:   [facility]   [address] [facility] [address] [facility] [address] [phone]    Communication Preference:  Phone   Additional Information:

## 2018-09-11 DIAGNOSIS — Z79.4 UNCONTROLLED TYPE 2 DIABETES MELLITUS WITH HYPEROSMOLAR COMA, WITH LONG-TERM CURRENT USE OF INSULIN: Primary | ICD-10-CM

## 2018-09-11 DIAGNOSIS — E11.01 UNCONTROLLED TYPE 2 DIABETES MELLITUS WITH HYPEROSMOLAR COMA, WITH LONG-TERM CURRENT USE OF INSULIN: Primary | ICD-10-CM

## 2018-09-11 NOTE — TELEPHONE ENCOUNTER
----- Message from Alexandra Hernández sent at 9/11/2018  3:25 PM CDT -----  Contact: Self  .Rx Refill/Request     Is this a Refill or New Rx:  New, needs an order for the kit  Rx Name and Strength:  VGO kit  [facility] with phone number:  [facility]  Communication Preference: [phone] Fax: [phone]  Additional Information:

## 2019-08-14 RX ORDER — INSULIN LISPRO 100 [IU]/ML
INJECTION, SOLUTION INTRAVENOUS; SUBCUTANEOUS
Qty: 30 ML | Refills: 0 | OUTPATIENT
Start: 2019-08-14

## 2019-08-15 NOTE — PROGRESS NOTES
Subjective:      Patient ID: Estefani Sutherland is a 54 y.o. female.    Chief Complaint:  Diabetes    History of Present Illness  Estefani Sutherland is here for follow up of T2DM.  Previously seen by Dr. Steve.  Last seen 17.  This is their first visit with me.      Blind in the right eye and going blind in the left.    With regards to diabetes:  Outside Labs   19  GFR 39    3/31/19  A1c 9.5    Diagnosed: 27y.o.  Known complications:  DKA -  RN +  PN -  Nephropathy +    Current regimen:  Vgo 30 - Humalog 3-3-4 clicks and 2 clicks with snacks     Clicks right when she gets her food.    Patient and her  report that they are often running out of insulin in their Vgo, which does not add up if she is clicking appropriately.      Other medications tried:  MFM    Glucose Monitor:   4 times a day testing  Log reviewed: yes oral recall  Fastin- 150  Before meals: 180-190  Before bed: 140-200    Diet/Exercise:  Eats 3 meals a day.   Snacks : sporadically   Drinks : water   Exercise - tries to stay active     Hypoglycemia:  None  Knows how to correct with 15 grams of carbs- juice, coke, or a peppermint.      Education - last visit: 17  Eye Exam: 10/5/18  Podiatry: 17    Diabetes Management Status  Statin: Taking  ACE/ARB: Taking  Screening or Prevention Patient's value Goal Complete/Controlled?   HgA1C Testing and Control   Lab Results   Component Value Date    HGBA1C 11.3 (H) 2015      Annually/Less than 8% No   Lipid profile Most Recent Lipid Panel Health Maintenance Topic Completion: Not Found Annually No   LDL control Lab Results   Component Value Date    LDLCALC 137.0 2015    Annually/Less than 100 mg/dl  No   Nephropathy screening Lab Results   Component Value Date    LABMICR 3806.0 2017     Lab Results   Component Value Date    PROTEINUA 2+ (A) 2016    Annually No   Blood pressure BP Readings from Last 1 Encounters:   19 (!) 146/84    Less than 140/90 No   Dilated  "retinal exam : 10/19/2015 Annually No   Foot exam   Most Recent Foot Exam Date: Not Found Annually No     Review of Systems   Constitutional: Negative for fatigue.   Eyes: Negative for visual disturbance.   Respiratory: Negative for shortness of breath.    Cardiovascular: Negative for chest pain.   Gastrointestinal: Negative for abdominal pain.   Musculoskeletal: Negative for arthralgias.   Skin: Negative for wound.   Neurological: Negative for headaches.   Hematological: Does not bruise/bleed easily.   Psychiatric/Behavioral: Negative for sleep disturbance.     Objective:   Physical Exam   Neck: No thyromegaly present.   Cardiovascular: Normal rate.   No edema present   Pulmonary/Chest: Effort normal.   Abdominal: Soft.   Vitals reviewed.    Visit Vitals  BP (!) 146/84   Pulse 88   Resp 20   Ht 5' 2" (1.575 m)   Wt 69 kg (152 lb 3.6 oz)   BMI 27.84 kg/m²     Appropriate footwear, foot exam deferred, per patient request.  Injection sites are okay. No lipo hypertropthy or atrophy.    Body mass index is 27.84 kg/m².    Lab Review:   Lab Results   Component Value Date    HGBA1C 11.3 (H) 09/25/2015     Lab Results   Component Value Date    CHOL 218 (H) 05/29/2015    HDL 59 05/29/2015    LDLCALC 137.0 05/29/2015    TRIG 110 05/29/2015    CHOLHDL 27.1 05/29/2015     Lab Results   Component Value Date     01/22/2016    K 4.3 01/22/2016     01/22/2016    CO2 28 01/22/2016     (H) 01/22/2016    BUN 16 01/22/2016    CREATININE 0.8 01/22/2016    CALCIUM 10.3 01/22/2016    PROT 7.2 01/22/2016    ALBUMIN 3.5 01/22/2016    BILITOT 0.4 01/22/2016    ALKPHOS 77 01/22/2016    AST 16 01/22/2016    ALT 17 01/22/2016    ANIONGAP 8 01/22/2016    ESTGFRAFRICA >60 01/22/2016    EGFRNONAA >60 01/22/2016    TSH 0.953 05/29/2015     Assessment and Plan     1. Type 2 diabetes mellitus with complication, unspecified whether long term insulin use  Hemoglobin A1c    Comprehensive metabolic panel    Lipid panel    " Microalbumin/creatinine urine ratio    CBC auto differential    Ambulatory Referral to Diabetes Education    sub-q insulin device, 30 unit (VGO 30) Cor[place]    insulin lispro 100 unit/mL injection   2. Essential hypertension, benign     3. Mixed hyperlipidemia     4. Uncontrolled type 2 diabetes mellitus with hyperosmolar coma, with long-term current use of insulin     5. Uncontrolled type 2 diabetes mellitus with complication, with long-term current use of insulin     6. Diabetic retinopathy with macular edema associated with type 2 diabetes mellitus, unspecified laterality, unspecified retinopathy severity       Type 2 diabetes mellitus  -- Labs prior  -- Medication Changes:   INCREASE:  VGO 30 4clicks TIDAC and 2clicks with snacks  -- Refer to diabetes education as patient and her  report to run out of insulin in their Vgo, which does not add up.   -- Reviewed goals of therapy are to get the best control we can without hypoglycemia  -- Reviewed patient's current insulin regimen. Clarified proper insulin dose and timing in relation to meals, etc. Insulin injection sites and proper rotation instructed.    -- Advised frequent self blood glucose monitoring.  Patient encouraged to document glucose results and bring them to every clinic visit.  -- Hypoglycemia precautions discussed. Instructed on precautions before driving.    -- Call for Bg repeatedly < 90 or > 180.   -- Close adherence to lifestyle changes recommended.   -- Periodic follow ups for eye evaluations, foot care and dental care suggested.    Essential hypertension, benign  -- On ARB  -- Controlled.  -- Blood pressure goals discussed with patient.    Mixed hyperlipidemia  -- Controlled.  -- On statin per ADA recommendations.    Diabetic retinopathy  -- Optimize glucose control.     Follow up in about 3 months (around 11/19/2019).

## 2019-08-19 ENCOUNTER — OFFICE VISIT (OUTPATIENT)
Dept: ENDOCRINOLOGY | Facility: CLINIC | Age: 54
End: 2019-08-19
Payer: MEDICARE

## 2019-08-19 VITALS
RESPIRATION RATE: 20 BRPM | HEIGHT: 62 IN | DIASTOLIC BLOOD PRESSURE: 84 MMHG | BODY MASS INDEX: 28.02 KG/M2 | WEIGHT: 152.25 LBS | HEART RATE: 88 BPM | SYSTOLIC BLOOD PRESSURE: 146 MMHG

## 2019-08-19 DIAGNOSIS — I10 ESSENTIAL HYPERTENSION, BENIGN: Chronic | ICD-10-CM

## 2019-08-19 DIAGNOSIS — E11.311 DIABETIC RETINOPATHY WITH MACULAR EDEMA ASSOCIATED WITH TYPE 2 DIABETES MELLITUS, UNSPECIFIED LATERALITY, UNSPECIFIED RETINOPATHY SEVERITY: Chronic | ICD-10-CM

## 2019-08-19 DIAGNOSIS — E78.2 MIXED HYPERLIPIDEMIA: Chronic | ICD-10-CM

## 2019-08-19 DIAGNOSIS — E11.8 TYPE 2 DIABETES MELLITUS WITH COMPLICATION, UNSPECIFIED WHETHER LONG TERM INSULIN USE: Primary | Chronic | ICD-10-CM

## 2019-08-19 DIAGNOSIS — E11.01 UNCONTROLLED TYPE 2 DIABETES MELLITUS WITH HYPEROSMOLAR COMA, WITH LONG-TERM CURRENT USE OF INSULIN: ICD-10-CM

## 2019-08-19 DIAGNOSIS — Z79.4 UNCONTROLLED TYPE 2 DIABETES MELLITUS WITH HYPEROSMOLAR COMA, WITH LONG-TERM CURRENT USE OF INSULIN: ICD-10-CM

## 2019-08-19 PROCEDURE — 99999 PR PBB SHADOW E&M-EST. PATIENT-LVL IV: CPT | Mod: PBBFAC,,, | Performed by: NURSE PRACTITIONER

## 2019-08-19 PROCEDURE — 99214 OFFICE O/P EST MOD 30 MIN: CPT | Mod: PBBFAC | Performed by: NURSE PRACTITIONER

## 2019-08-19 PROCEDURE — 99214 OFFICE O/P EST MOD 30 MIN: CPT | Mod: S$GLB,ICN,, | Performed by: NURSE PRACTITIONER

## 2019-08-19 PROCEDURE — 99214 PR OFFICE/OUTPT VISIT, EST, LEVL IV, 30-39 MIN: ICD-10-PCS | Mod: S$GLB,ICN,, | Performed by: NURSE PRACTITIONER

## 2019-08-19 PROCEDURE — 99999 PR PBB SHADOW E&M-EST. PATIENT-LVL IV: ICD-10-PCS | Mod: PBBFAC,,, | Performed by: NURSE PRACTITIONER

## 2019-08-19 RX ORDER — LOSARTAN POTASSIUM 50 MG/1
50 TABLET ORAL DAILY
COMMUNITY
End: 2022-07-22

## 2019-08-19 RX ORDER — INSULIN LISPRO 100 [IU]/ML
INJECTION, SOLUTION INTRAVENOUS; SUBCUTANEOUS
Qty: 3 VIAL | Refills: 6 | Status: SHIPPED | OUTPATIENT
Start: 2019-08-19 | End: 2021-02-17

## 2019-08-19 NOTE — ASSESSMENT & PLAN NOTE
-- Labs prior  -- Medication Changes:   INCREASE:  VGO 30 4clicks TIDAC and 2clicks with snacks  -- Refer to diabetes education as patient and her  report to run out of insulin in their Vgo, which does not add up.   -- Reviewed goals of therapy are to get the best control we can without hypoglycemia  -- Reviewed patient's current insulin regimen. Clarified proper insulin dose and timing in relation to meals, etc. Insulin injection sites and proper rotation instructed.    -- Advised frequent self blood glucose monitoring.  Patient encouraged to document glucose results and bring them to every clinic visit.  -- Hypoglycemia precautions discussed. Instructed on precautions before driving.    -- Call for Bg repeatedly < 90 or > 180.   -- Close adherence to lifestyle changes recommended.   -- Periodic follow ups for eye evaluations, foot care and dental care suggested.

## 2019-09-11 ENCOUNTER — TELEPHONE (OUTPATIENT)
Dept: ENDOCRINOLOGY | Facility: CLINIC | Age: 54
End: 2019-09-11

## 2019-09-11 NOTE — TELEPHONE ENCOUNTER
----- Message from Ml Knox sent at 9/11/2019  9:14 AM CDT -----  Contact: Self/ [phone]  Patient would like a call back to speak with someone in office to ask questions about her labs and appts.

## 2019-11-22 ENCOUNTER — LAB VISIT (OUTPATIENT)
Dept: LAB | Facility: HOSPITAL | Age: 54
End: 2019-11-22
Attending: INTERNAL MEDICINE
Payer: MEDICARE

## 2019-11-22 ENCOUNTER — TELEPHONE (OUTPATIENT)
Dept: ENDOCRINOLOGY | Facility: HOSPITAL | Age: 54
End: 2019-11-22

## 2019-11-22 ENCOUNTER — OFFICE VISIT (OUTPATIENT)
Dept: ENDOCRINOLOGY | Facility: CLINIC | Age: 54
End: 2019-11-22
Payer: MEDICARE

## 2019-11-22 VITALS
SYSTOLIC BLOOD PRESSURE: 120 MMHG | BODY MASS INDEX: 27.98 KG/M2 | DIASTOLIC BLOOD PRESSURE: 76 MMHG | HEIGHT: 63 IN | RESPIRATION RATE: 16 BRPM | HEART RATE: 76 BPM | WEIGHT: 157.94 LBS

## 2019-11-22 DIAGNOSIS — Z79.4 TYPE 2 DIABETES MELLITUS WITH PROLIFERATIVE RETINOPATHY OF BOTH EYES, WITH LONG-TERM CURRENT USE OF INSULIN, MACULAR EDEMA PRESENCE UNSPECIFIED, UNSPECIFIED PROLIFERATIVE RETINOPATHY TYPE: ICD-10-CM

## 2019-11-22 DIAGNOSIS — E11.42 DIABETIC POLYNEUROPATHY ASSOCIATED WITH TYPE 2 DIABETES MELLITUS: Chronic | ICD-10-CM

## 2019-11-22 DIAGNOSIS — Z79.4 UNCONTROLLED TYPE 2 DIABETES MELLITUS WITH HYPEROSMOLAR COMA, WITH LONG-TERM CURRENT USE OF INSULIN: ICD-10-CM

## 2019-11-22 DIAGNOSIS — E11.3593 TYPE 2 DIABETES MELLITUS WITH PROLIFERATIVE RETINOPATHY OF BOTH EYES, WITH LONG-TERM CURRENT USE OF INSULIN, MACULAR EDEMA PRESENCE UNSPECIFIED, UNSPECIFIED PROLIFERATIVE RETINOPATHY TYPE: ICD-10-CM

## 2019-11-22 DIAGNOSIS — Z86.73 HISTORY OF STROKE: ICD-10-CM

## 2019-11-22 DIAGNOSIS — E11.01 UNCONTROLLED TYPE 2 DIABETES MELLITUS WITH HYPEROSMOLAR COMA, WITH LONG-TERM CURRENT USE OF INSULIN: ICD-10-CM

## 2019-11-22 DIAGNOSIS — E78.2 MIXED HYPERLIPIDEMIA: Chronic | ICD-10-CM

## 2019-11-22 LAB
ALBUMIN SERPL BCP-MCNC: 3.8 G/DL (ref 3.5–5.2)
ALP SERPL-CCNC: 88 U/L (ref 55–135)
ALT SERPL W/O P-5'-P-CCNC: 25 U/L (ref 10–44)
ANION GAP SERPL CALC-SCNC: 10 MMOL/L (ref 8–16)
AST SERPL-CCNC: 17 U/L (ref 10–40)
BASOPHILS # BLD AUTO: 0.05 K/UL (ref 0–0.2)
BASOPHILS NFR BLD: 0.5 % (ref 0–1.9)
BILIRUB SERPL-MCNC: 0.3 MG/DL (ref 0.1–1)
BUN SERPL-MCNC: 30 MG/DL (ref 6–20)
CALCIUM SERPL-MCNC: 10.3 MG/DL (ref 8.7–10.5)
CHLORIDE SERPL-SCNC: 99 MMOL/L (ref 95–110)
CHOLEST SERPL-MCNC: 313 MG/DL (ref 120–199)
CHOLEST/HDLC SERPL: 5.8 {RATIO} (ref 2–5)
CO2 SERPL-SCNC: 32 MMOL/L (ref 23–29)
CREAT SERPL-MCNC: 1.7 MG/DL (ref 0.5–1.4)
DIFFERENTIAL METHOD: ABNORMAL
EOSINOPHIL # BLD AUTO: 0.2 K/UL (ref 0–0.5)
EOSINOPHIL NFR BLD: 2.1 % (ref 0–8)
ERYTHROCYTE [DISTWIDTH] IN BLOOD BY AUTOMATED COUNT: 13.2 % (ref 11.5–14.5)
EST. GFR  (AFRICAN AMERICAN): 38.6 ML/MIN/1.73 M^2
EST. GFR  (NON AFRICAN AMERICAN): 33.5 ML/MIN/1.73 M^2
ESTIMATED AVG GLUCOSE: 235 MG/DL (ref 68–131)
GLUCOSE SERPL-MCNC: 188 MG/DL (ref 70–110)
HBA1C MFR BLD HPLC: 9.8 % (ref 4–5.6)
HCT VFR BLD AUTO: 40.2 % (ref 37–48.5)
HDLC SERPL-MCNC: 54 MG/DL (ref 40–75)
HDLC SERPL: 17.3 % (ref 20–50)
HGB BLD-MCNC: 12.3 G/DL (ref 12–16)
IMM GRANULOCYTES # BLD AUTO: 0.04 K/UL (ref 0–0.04)
IMM GRANULOCYTES NFR BLD AUTO: 0.4 % (ref 0–0.5)
LDLC SERPL CALC-MCNC: 211 MG/DL (ref 63–159)
LYMPHOCYTES # BLD AUTO: 1.9 K/UL (ref 1–4.8)
LYMPHOCYTES NFR BLD: 17.4 % (ref 18–48)
MCH RBC QN AUTO: 26.9 PG (ref 27–31)
MCHC RBC AUTO-ENTMCNC: 30.6 G/DL (ref 32–36)
MCV RBC AUTO: 88 FL (ref 82–98)
MONOCYTES # BLD AUTO: 0.8 K/UL (ref 0.3–1)
MONOCYTES NFR BLD: 7.3 % (ref 4–15)
NEUTROPHILS # BLD AUTO: 7.9 K/UL (ref 1.8–7.7)
NEUTROPHILS NFR BLD: 72.3 % (ref 38–73)
NONHDLC SERPL-MCNC: 259 MG/DL
NRBC BLD-RTO: 0 /100 WBC
PLATELET # BLD AUTO: 366 K/UL (ref 150–350)
PMV BLD AUTO: 10.6 FL (ref 9.2–12.9)
POTASSIUM SERPL-SCNC: 4.2 MMOL/L (ref 3.5–5.1)
PROT SERPL-MCNC: 7.9 G/DL (ref 6–8.4)
RBC # BLD AUTO: 4.58 M/UL (ref 4–5.4)
SODIUM SERPL-SCNC: 141 MMOL/L (ref 136–145)
TRIGL SERPL-MCNC: 240 MG/DL (ref 30–150)
WBC # BLD AUTO: 10.97 K/UL (ref 3.9–12.7)

## 2019-11-22 PROCEDURE — 3074F PR MOST RECENT SYSTOLIC BLOOD PRESSURE < 130 MM HG: ICD-10-PCS | Mod: CPTII,S$GLB,, | Performed by: INTERNAL MEDICINE

## 2019-11-22 PROCEDURE — 3008F BODY MASS INDEX DOCD: CPT | Mod: CPTII,S$GLB,, | Performed by: INTERNAL MEDICINE

## 2019-11-22 PROCEDURE — 80053 COMPREHEN METABOLIC PANEL: CPT

## 2019-11-22 PROCEDURE — 3074F SYST BP LT 130 MM HG: CPT | Mod: CPTII,S$GLB,, | Performed by: INTERNAL MEDICINE

## 2019-11-22 PROCEDURE — 99214 PR OFFICE/OUTPT VISIT, EST, LEVL IV, 30-39 MIN: ICD-10-PCS | Mod: S$GLB,,, | Performed by: INTERNAL MEDICINE

## 2019-11-22 PROCEDURE — 3078F PR MOST RECENT DIASTOLIC BLOOD PRESSURE < 80 MM HG: ICD-10-PCS | Mod: CPTII,S$GLB,, | Performed by: INTERNAL MEDICINE

## 2019-11-22 PROCEDURE — 36415 COLL VENOUS BLD VENIPUNCTURE: CPT

## 2019-11-22 PROCEDURE — 99999 PR PBB SHADOW E&M-EST. PATIENT-LVL IV: CPT | Mod: PBBFAC,,, | Performed by: INTERNAL MEDICINE

## 2019-11-22 PROCEDURE — 80061 LIPID PANEL: CPT

## 2019-11-22 PROCEDURE — 3078F DIAST BP <80 MM HG: CPT | Mod: CPTII,S$GLB,, | Performed by: INTERNAL MEDICINE

## 2019-11-22 PROCEDURE — 3008F PR BODY MASS INDEX (BMI) DOCUMENTED: ICD-10-PCS | Mod: CPTII,S$GLB,, | Performed by: INTERNAL MEDICINE

## 2019-11-22 PROCEDURE — 83036 HEMOGLOBIN GLYCOSYLATED A1C: CPT

## 2019-11-22 PROCEDURE — 85025 COMPLETE CBC W/AUTO DIFF WBC: CPT

## 2019-11-22 PROCEDURE — 99214 OFFICE O/P EST MOD 30 MIN: CPT | Mod: S$GLB,,, | Performed by: INTERNAL MEDICINE

## 2019-11-22 PROCEDURE — 99999 PR PBB SHADOW E&M-EST. PATIENT-LVL IV: ICD-10-PCS | Mod: PBBFAC,,, | Performed by: INTERNAL MEDICINE

## 2019-11-22 NOTE — ASSESSMENT & PLAN NOTE
R MCA with expressive aphasia in 2015.   On ASA and high intensity statin.  May need intensification of statin therapy based on LDL (goal <70)

## 2019-11-22 NOTE — TELEPHONE ENCOUNTER
Spoke with patient re: blood work. Cr mildly improved since 4 days ago. Significant microalbuminuria from poorly controlled diabetes. Her cholesterol medication was also switched recently, but she can't remember the name. She will call back Monday to leave a message with the name of the medication. Scheduled for 7 day CGMS on 12/4.

## 2019-11-22 NOTE — PROGRESS NOTES
"Subjective:      Chief Complaint: Follow-up for type 2 diabetes mellitus    HPI: Estefani Sutherland is a 54 y.o. female who is here for a follow-up evaluation for type 2 diabetes mellitus    Patient is new to me and was last seen by RAMIN Camarena in 8/2019    With regards to the diabetes:  Went to ER on 11/18 for hypoglycemia and hypothermia. Per ED records, she had high glucose and clicked V-YumZing multiple times because she felt it wasn't working. Patient reports she only clicked 3 times, but forgot to eat a meal, which resulted in the low sugar. This was her first hypoglycemic episode in the past year. Last "low" was 73 in October of 2019.    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 She was told her insurance did not cover an education appointment.    Current monitoring regimen: home blood tests - 4 times daily  Home blood sugar records: Did not bring meter or log.  From recall:  AM: 180-200s  Pre-lunch/Pre-dinner: 200-300s  Bedtime: 150-300s  Any episodes of hypoglycemia? Yes, see above. She says this was a one time occurrence and has not been a recurrent issue.    Diabetic Health Maintenance:        HbA1c < 7.0%: No        Blood pressure <130/80:  Yes          BP Readings from Last 3 Encounters:   11/22/19 120/76   08/19/19 (!) 146/84   11/01/17 (!) 143/92           Low dose ASA?: Yes    Wt Readings from Last 10 Encounters:   11/22/19 71.6 kg (157 lb 15.4 oz)   08/19/19 69 kg (152 lb 3.6 oz)   11/01/17 64.8 kg (142 lb 14.4 oz)   08/02/17 64 kg (141 lb)   08/01/17 64 kg (141 lb 1.5 oz)   01/22/16 61.7 kg (136 " lb)   10/23/15 60.8 kg (134 lb)   10/02/15 60.8 kg (134 lb)   09/25/15 60.8 kg (134 lb)   07/16/15 61.2 kg (135 lb)       Diabetes Management Status    Statin: Taking  ACE/ARB: Taking    Screening or Prevention Patient's value Goal Complete/Controlled?   HgA1C Testing and Control   9.5% on 3/31/2019   Annually/Less than 8% No   Lipid profile 4/2/2019 (outside labs) Annually Yes   LDL control Lab Results   Component Value Date    LDLCALC 136 4/2/2019    Annually/Less than 100 mg/dl  No   Nephropathy screening Lab Results   Component Value Date    LABMICR 3806.0 08/01/2017     Lab Results   Component Value Date    PROTEINUA 2+ (A) 01/22/2016    Annually No   Blood pressure BP Readings from Last 1 Encounters:   11/22/19 120/76    Less than 140/90 Yes   Dilated retinal exam +Diabetic retinopathy OU  [facility] every 4-8 weeks - upcoming visit in ~2 weeks. Annually Yes   Foot exam   Today Annually No        Lab Results   Component Value Date    HGBA1C 11.3 (H) 09/25/2015    HGBA1C 12.3 (H) 05/29/2015    HGBA1C 13.8 (H) 04/15/2014         Reviewed past medical, family, social history and updated as appropriate.    Review of Systems   Respiratory: Negative for shortness of breath.    Cardiovascular: Negative for chest pain.   Gastrointestinal: Negative for abdominal pain.     Objective:     Vitals:    11/22/19 0810   BP: 120/76   Pulse: 76   Resp: 16     BP Readings from Last 5 Encounters:   11/22/19 120/76   08/19/19 (!) 146/84   11/01/17 (!) 143/92   08/02/17 (!) 158/94   08/01/17 (!) 140/90       Physical Exam   Constitutional: She appears well-developed.   Cardiovascular: Normal rate.   No murmur heard.  Pulses:       Dorsalis pedis pulses are 2+ on the right side, and 2+ on the left side.        Posterior tibial pulses are 2+ on the right side, and 2+ on the left side.   Pulmonary/Chest: Effort normal and breath sounds normal.   Abdominal: Soft. There is no tenderness.   Musculoskeletal: She  exhibits no edema.        Right foot: There is no deformity.        Left foot: There is no deformity.   Gait Normal  No digital clubbing or extremity cyanosis   Feet:   Right Foot:   Protective Sensation: 7 sites tested. 0 sites sensed.   Skin Integrity: Positive for callus and dry skin. Negative for ulcer, blister, skin breakdown (Small 2-3 eschar on dorsal surface of right second toe (bumped on something per )), erythema or warmth.   Left Foot:   Protective Sensation: 7 sites tested. 0 sites sensed.   Skin Integrity: Positive for callus and dry skin. Negative for ulcer, blister, skin breakdown, erythema or warmth.   Skin: No rash noted.   V-go in place anterior abdomen. Injection sites okay.     Nursing note and vitals reviewed.      Wt Readings from Last 10 Encounters:   11/22/19 0810 71.6 kg (157 lb 15.4 oz)   08/19/19 1506 69 kg (152 lb 3.6 oz)   11/01/17 0727 64.8 kg (142 lb 14.4 oz)   08/02/17 0716 64 kg (141 lb)   08/01/17 0856 64 kg (141 lb 1.5 oz)   01/22/16 0958 61.7 kg (136 lb)   10/23/15 2151 60.8 kg (134 lb)   10/02/15 2034 60.8 kg (134 lb)   09/25/15 0919 60.8 kg (134 lb)   07/16/15 0832 61.2 kg (135 lb)       Lab Results   Component Value Date    HGBA1C 11.3 (H) 09/25/2015     Lab Results   Component Value Date    CHOL 218 (H) 05/29/2015    HDL 59 05/29/2015    LDLCALC 137.0 05/29/2015    TRIG 110 05/29/2015    CHOLHDL 27.1 05/29/2015     Lab Results   Component Value Date     01/22/2016    K 4.3 01/22/2016     01/22/2016    CO2 28 01/22/2016     (H) 01/22/2016    BUN 16 01/22/2016    CREATININE 0.8 01/22/2016    CALCIUM 10.3 01/22/2016    PROT 7.2 01/22/2016    ALBUMIN 3.5 01/22/2016    BILITOT 0.4 01/22/2016    ALKPHOS 77 01/22/2016    AST 16 01/22/2016    ALT 17 01/22/2016    ANIONGAP 8 01/22/2016    ESTGFRAFRICA >60 01/22/2016    EGFRNONAA >60 01/22/2016    TSH 0.953 05/29/2015      Lab Results   Component Value Date    MICALBCREAT 2861.7 (H) 08/01/2017        Assessment/Plan:     Type 2 diabetes mellitus with ophthalmic complication, with long-term current use of insulin  Reviewed goals of therapy are to get the best control we can without hypoglycemia    Referred to education today    Medication changes:   No changes in regimen today. Will get 7 day CGMS results before determining treatment changes. She will likely need additional clicks with meals and possibly increase to Vgo 40.     Reviewed patient's current insulin regimen. Clarified proper insulin dose and timing in relation to meals - recommended doing clicks about 5 minutes prior to eating. Currently bolusing either at time of meal, during, or after.    Advised frequent self blood glucose monitoring. Patient encouraged to document glucose results and bring them to every clinic visit. Patient is checking BG 4 times daily, is at high risk for diabetes complications and is using a V-go device with continuous and bolus insulin delivery. Will send Rx for Dexcom G6 to pharmacy to determine benefits coverage.    Hypoglycemia precautions discussed. She is aware how to treat hypoglycemia.    Close adherence to lifestyle changes recommended.      Eyes: Seen regularly by outside ophthalmologist. Upcoming appointment in 2 weeks.  Feet: Foot exam performed today; high risk for complications. Referred to Podiatry  Kidneys: Urine microalbumin/Cr ordered today  HbA1c: Check now  Lipids: On high intensity statin. Check lipids today  ASA: Yes      Diabetic neuropathy  See above. Referred to podiatry.    History of stroke  R MCA with expressive aphasia in 2015.   On ASA and high intensity statin.  May need intensification of statin therapy based on LDL (goal <70)    Mixed hyperlipidemia  See above.      RTC in 3 months

## 2019-11-22 NOTE — ASSESSMENT & PLAN NOTE
Reviewed goals of therapy are to get the best control we can without hypoglycemia    Referred to education today    Medication changes:   No changes in regimen today. Will get 7 day CGMS results before determining treatment changes. She will likely need additional clicks with meals and possibly increase to Vgo 40.     Reviewed patient's current insulin regimen. Clarified proper insulin dose and timing in relation to meals - recommended doing clicks about 5 minutes prior to eating. Currently bolusing either at time of meal, during, or after.    Advised frequent self blood glucose monitoring. Patient encouraged to document glucose results and bring them to every clinic visit. Patient is checking BG 4 times daily, is at high risk for diabetes complications and is using a V-go device with continuous and bolus insulin delivery. Will send Rx for Dexcom G6 to pharmacy to determine benefits coverage.    Hypoglycemia precautions discussed. She is aware how to treat hypoglycemia.    Close adherence to lifestyle changes recommended.      Eyes: Seen regularly by outside ophthalmologist. Upcoming appointment in 2 weeks.  Feet: Foot exam performed today; high risk for complications. Referred to Podiatry  Kidneys: Urine microalbumin/Cr ordered today  HbA1c: Check now  Lipids: On high intensity statin. Check lipids today  ASA: Yes

## 2019-12-04 ENCOUNTER — CLINICAL SUPPORT (OUTPATIENT)
Dept: ENDOCRINOLOGY | Facility: CLINIC | Age: 54
End: 2019-12-04
Payer: MEDICARE

## 2019-12-04 DIAGNOSIS — E11.01 UNCONTROLLED TYPE 2 DIABETES MELLITUS WITH HYPEROSMOLAR COMA, WITH LONG-TERM CURRENT USE OF INSULIN: ICD-10-CM

## 2019-12-04 DIAGNOSIS — Z79.4 UNCONTROLLED TYPE 2 DIABETES MELLITUS WITH HYPEROSMOLAR COMA, WITH LONG-TERM CURRENT USE OF INSULIN: ICD-10-CM

## 2019-12-04 NOTE — PROGRESS NOTES
"DIABETES EDUCATOR NOTE   PLACEMENT OF FREESTYLE THEO PRO SENSOR  CONTINOUS GLUCOSE MONITORING SYSTEM (CGMS)    Patient is here in clinic today for placement of continuous glucose monitoring sensor.      Each patient verified that they were here for CGMS procedure ordered by their provider and that they have a working glucose meter and supplies at home.   Patient will be provided with a Freestyle Theo Sensor and a copy of the Continuous Glucose Monitoring Patient Log to fill out during the study.   A detailed  explanation of Continuous Glucose Monitoring was  provided. Patient informed that this is a blind procedure and that they will not actually see the blood sugar tracing in real time.  Reviewed with patient the [employer] patient education handout called "Your Freestyle Theo Pro sensor: What you need to know" to review self-care during the study to avoid sensor loosening or removal ie... Bathing, Swimming, dressing, and exercising.   Instructed patient to check blood sugar using home glucometer and to record the following on provided patient log sheets:Blood sugar taken at home, Meals and snacks, Activity, and Diabetes medications taken and dosage    Patient was brought to a private location.  Arm for insertion was selected and prepared and allowed to dry. Glucose Sensor Serial Number [id number]  was inserted to back of patient's right upper arm.    The following forms  were given and reviewed in detail with patient and all questions answered.   · Continuous Glucose Monitoring Patient Log #[id number]  · [employer] Patient Handout "Your Freestyle Theo Pro Sensor: What you need to know"     Instructions held in a group: Time: 15 min   Insertion of sensor done individually in private:  Time: 5 minutes       "

## 2019-12-10 ENCOUNTER — TELEPHONE (OUTPATIENT)
Dept: ENDOCRINOLOGY | Facility: CLINIC | Age: 54
End: 2019-12-10

## 2019-12-10 ENCOUNTER — CLINICAL SUPPORT (OUTPATIENT)
Dept: ENDOCRINOLOGY | Facility: CLINIC | Age: 54
End: 2019-12-10
Payer: MEDICARE

## 2019-12-10 DIAGNOSIS — E11.01 UNCONTROLLED TYPE 2 DIABETES MELLITUS WITH HYPEROSMOLAR COMA, WITH LONG-TERM CURRENT USE OF INSULIN: ICD-10-CM

## 2019-12-10 DIAGNOSIS — Z79.4 UNCONTROLLED TYPE 2 DIABETES MELLITUS WITH HYPEROSMOLAR COMA, WITH LONG-TERM CURRENT USE OF INSULIN: ICD-10-CM

## 2019-12-10 PROCEDURE — 95251 CONT GLUC MNTR ANALYSIS I&R: CPT | Mod: S$GLB,,, | Performed by: INTERNAL MEDICINE

## 2019-12-10 PROCEDURE — 95250 CONT GLUC MNTR PHYS/QHP EQP: CPT | Mod: PBBFAC | Performed by: DIETITIAN, REGISTERED

## 2019-12-10 PROCEDURE — 95251 PR GLUCOSE MONITOR, 72 HOUR, PHYS INTERP: ICD-10-PCS | Mod: S$GLB,,, | Performed by: INTERNAL MEDICINE

## 2019-12-10 NOTE — TELEPHONE ENCOUNTER
Received letter from [employer]. Trying to get in touch with patient. Called patient LVM regard dexcom trying to reach her and the  phone number for Dexcom

## 2019-12-12 NOTE — PROGRESS NOTES
DIABETES EDUCATOR NOTE   Return of the Freestyle Moris Pro Sensor and Patient Log.    Patient returned to clinic today to return Glucose Sensor and signed patient log form used in [facility] procedure.    The CGMS Sensor will be scanned and downloaded. All reports will be imported into the patient's electronic medical record.    Endocrine Nurse Practitioner will complete data interpretation and make recommendations; will forward recommendations to the ordering provider for follow up with patient.     ,

## 2019-12-13 ENCOUNTER — TELEPHONE (OUTPATIENT)
Dept: ENDOCRINOLOGY | Facility: CLINIC | Age: 54
End: 2019-12-13

## 2019-12-13 DIAGNOSIS — E11.8 TYPE 2 DIABETES MELLITUS WITH COMPLICATION: Primary | ICD-10-CM

## 2019-12-13 NOTE — TELEPHONE ENCOUNTER
I spoke with her regarding the CGMS results. Advised to increase to Vgo 40 (new Rx) and to increase to 5 clicks with meals. She was hesitant to go to 5 clicks because she's had hypoglycemia in the past. We will instead go up to 4 clicks with each meal and see how she does. We are also working on getting her a personal CGM. Trying for Dexcom G6 first. If not approved, Freestyle casey.

## 2020-02-12 ENCOUNTER — PATIENT OUTREACH (OUTPATIENT)
Dept: DIABETES | Facility: CLINIC | Age: 55
End: 2020-02-12

## 2020-02-12 NOTE — PROGRESS NOTES
Returned patient's call regarding questions about nutrition. Call went to voice mail.  Left contact phone number.

## 2020-02-23 ENCOUNTER — TELEPHONE (OUTPATIENT)
Dept: ENDOCRINOLOGY | Facility: HOSPITAL | Age: 55
End: 2020-02-23

## 2020-02-23 DIAGNOSIS — E11.8 TYPE 2 DIABETES MELLITUS WITH COMPLICATION: Primary | ICD-10-CM

## 2020-02-27 DIAGNOSIS — E11.42 TYPE 2 DIABETES MELLITUS WITH DIABETIC POLYNEUROPATHY, WITH LONG-TERM CURRENT USE OF INSULIN: Primary | ICD-10-CM

## 2020-02-27 DIAGNOSIS — Z79.4 TYPE 2 DIABETES MELLITUS WITH DIABETIC POLYNEUROPATHY, WITH LONG-TERM CURRENT USE OF INSULIN: Primary | ICD-10-CM

## 2020-04-17 ENCOUNTER — TELEPHONE (OUTPATIENT)
Dept: ENDOCRINOLOGY | Facility: CLINIC | Age: 55
End: 2020-04-17

## 2020-04-17 NOTE — TELEPHONE ENCOUNTER
----- Message from Melissa Winkler sent at 4/17/2020  1:28 PM CDT -----  Contact: Nola from [facility]   NP Vitor prescribed the PT humalog but there's no directions on it     Callback: [phone]

## 2020-04-17 NOTE — TELEPHONE ENCOUNTER
"Spoke w/ pharmacist "Nola" verified 4 clicks TIDAC and 2 clicks w/ snacks. Up to 100 units/daily.    "

## 2020-04-20 ENCOUNTER — TELEPHONE (OUTPATIENT)
Dept: ENDOCRINOLOGY | Facility: CLINIC | Age: 55
End: 2020-04-20

## 2020-04-20 ENCOUNTER — OFFICE VISIT (OUTPATIENT)
Dept: ENDOCRINOLOGY | Facility: CLINIC | Age: 55
End: 2020-04-20
Payer: COMMERCIAL

## 2020-04-20 DIAGNOSIS — E78.2 MIXED HYPERLIPIDEMIA: Chronic | ICD-10-CM

## 2020-04-20 DIAGNOSIS — Z79.4 TYPE 2 DIABETES MELLITUS WITH PROLIFERATIVE RETINOPATHY OF BOTH EYES, WITH LONG-TERM CURRENT USE OF INSULIN, MACULAR EDEMA PRESENCE UNSPECIFIED, UNSPECIFIED PROLIFERATIVE RETINOPATHY TYPE: ICD-10-CM

## 2020-04-20 DIAGNOSIS — E11.3593 TYPE 2 DIABETES MELLITUS WITH PROLIFERATIVE RETINOPATHY OF BOTH EYES, WITH LONG-TERM CURRENT USE OF INSULIN, MACULAR EDEMA PRESENCE UNSPECIFIED, UNSPECIFIED PROLIFERATIVE RETINOPATHY TYPE: ICD-10-CM

## 2020-04-20 PROCEDURE — 99212 OFFICE O/P EST SF 10 MIN: CPT | Mod: 95,,, | Performed by: INTERNAL MEDICINE

## 2020-04-20 PROCEDURE — 99212 PR OFFICE/OUTPT VISIT, EST, LEVL II, 10-19 MIN: ICD-10-PCS | Mod: 95,,, | Performed by: INTERNAL MEDICINE

## 2020-04-20 NOTE — ASSESSMENT & PLAN NOTE
LDL is above goal on atorvastatin 40.  At next visit we will need to discussed changing to Crestor 40 mg daily with repeat lipid panel in the future.  She is at high risk of cardiovascular complications thus may even need to be on repeat Pap in the future

## 2020-04-20 NOTE — TELEPHONE ENCOUNTER
----- Message from Ayaan Gan MD sent at 4/20/2020 10:01 AM CDT -----  Pt needs in person in 3 months w/ Karen Camarena, telephone visit in 1 mo w/ me

## 2020-04-20 NOTE — PROGRESS NOTES
Subjective:    This was a telemedicine visit which took place via telephone.      The reason for the telephone service rather than a virtual visit was:   Patient is blind and cannot use portal    During the visit, I was located in [place]  and the patient was located in [place] and I had access to and was able to review their medical record.      The patient has been informed that they have chosen to received care through the use of telemedicine. Telemedicine enables to healthcare providers at different locations to provide safe, effective, and convenient care through the use of technology.  There risks associated with use of telemedicine, including equipment failures.  They also understand that I cannot physically examine them.       Patient consented to the use of telemedicine in their medical care.     Patient verbally understands the risks and benefits of telemedicine as explained.       All questions regarding telemedicine were answered.       Reason for call:  Follow-up for type 2 diabetes    Chief Complaint: Follow-up for type 2 diabetes mellitus    HPI: Estefani Sutherland is a 55 y.o. female who is here for a follow-up evaluation for type 2 diabetes mellitus    Last seen by Dr. Sharif 11/2019 after which she had professional CGM showing consistently high blood sugar on VGO 30 thus she was increased in December to Vgo 40 with 4 clicks with meals however she says that she never got the Vgo 40 and still uses the Vgo 30.  Prescription was sent for dexcom G6 which she received but she doesn't know how to use it so has not started it.      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 and very poor vision in Left eye;   + nephropathy, last Cr 2.0 (GFR 32);   +Peripheral neuropathy without ulceration  + severe hypoglycemia in 11/2018 after giving insulin w/ Vgo and forgetting to eat    Cardiovascular risk factors: diabetes mellitus, dyslipidemia, hypertension  and microalbuminuria     Current diabetic medications include:   1) Vgo 30 - 3 clicks TID w/ meals and 2 clicks with snacks (was recommended 4 clicks but since 1/27/2020 has cut out starches so reduced to 3 clicks w/ meals, 2 w/ snacks still)    Freestyle casey  Has received dexcom but doesn't know how to use it    Prior visit with dietician: 12/2019    Current monitoring regimen: home blood tests - 4 times daily  Home blood sugar records:   From recall:  AM: mid 100s  Pre-lunch/Pre-dinner: 200s, occasionally in 300s  Bedtime: 160-low 200s  Any episodes of hypoglycemia?    Since last visit BG 48 at 3pm after not eating lunch then and BG 60 at 3 pm after not eating lunch   Had symptoms with the lows.    Diabetic Health Maintenance:        HbA1c < 7.0%: No        Blood pressure <130/80:  Yes        Low dose ASA?: Yes    Diabetes Management Status    Statin: Taking - atorvastatin 40 mg daily  ACE/ARB: Taking - losartan 50 mg daily    Screening or Prevention Patient's value Goal Complete/Controlled?   HgA1C Testing and Control   Lab Results   Component Value Date    HGBA1C 9.8 (H) 11/22/2019        Annually/Less than 8% No   Lipid profile  Annually Yes   LDL control Lab Results   Component Value Date    CHOL 313 (H) 11/22/2019    TRIG 240 (H) 11/22/2019    HDL 54 11/22/2019    LDLCALC 211.0 (H) 11/22/2019    CHOLHDL 17.3 (L) 11/22/2019     Annually/Less than 100 mg/dl  No   Nephropathy screening Lab Results   Component Value Date    LABMICR 250.0 11/22/2019     Lab Results   Component Value Date    PROTEINUA 2+ (A) 01/22/2016    Annually No   Blood pressure BP Readings from Last 1 Encounters:   11/22/19 120/76    Less than 140/90 Yes   Dilated retinal exam +Diabetic retinopathy OU  [facility] every 4-8 weeks - upcoming visit in ~2 weeks.  Last eye exam: : 10/19/2015  Annually Yes   Foot exam   Today  Last foot exam: : 11/22/2019)  Annually Yes        Lab Results   Component Value Date    HGBA1C 9.8  "(H) 11/22/2019    HGBA1C 11.3 (H) 09/25/2015    HGBA1C 12.3 (H) 05/29/2015     Current Outpatient Medications:     amLODIPine (NORVASC) 10 MG tablet, TK 1 T PO QD, Disp: , Rfl: 3    amlodipine (NORVASC) 5 MG tablet, Take 1 tablet (5 mg total) by mouth once daily. (Patient not taking: Reported on 11/22/2019), Disp: 90 tablet, Rfl: 0    aspirin (ECOTRIN) 81 MG EC tablet, Take 81 mg by mouth once daily., Disp: , Rfl:     atorvastatin (LIPITOR) 40 MG tablet, TK 1 T PO QHS FOR CHOLESTEROL, Disp: , Rfl: 5    blood sugar diagnostic Strp, Check sugar 4 times daily before meals and bedtime, Disp: 200 each, Rfl: 11    blood-glucose meter kit, Use as instructed, Disp: 1 each, Rfl: 0    blood-glucose sensor (DEXCOM G6 SENSOR) Corry, 3 each by Misc.(Non-Drug; Combo Route) route continuous prn., Disp: 3 each, Rfl: 11    blood-glucose transmitter (DEXCOM G6 TRANSMITTER) Corry, 1 each by Misc.(Non-Drug; Combo Route) route continuous prn., Disp: 1 each, Rfl: 3    fluconazole (DIFLUCAN) 150 MG Tab, TK 1 T PO FOR ONE DOSE THEN REPEAT IN 3 DAYS FOR YEAST INFECTION., Disp: , Rfl: 0    fluticasone (FLONASE) 50 mcg/actuation nasal spray, 2 sprays by Each Nare route once daily. (Patient not taking: Reported on 11/22/2019), Disp: 16 g, Rfl: 1    insulin lispro 100 unit/mL injection, To use with Vgo 30. (Patient not taking: Reported on 11/22/2019), Disp: 3 vial, Rfl: 6    insulin needles, disposable, 30 x 1/3 " Ndle, Use as directed tid, Disp: 100 each, Rfl: 5    insulin syringe-needle U-100 0.5 mL 31 gauge x 5/16 Syrg, USE TWICE DAILY AS DIRECTED, Disp: 90 each, Rfl: 1    losartan (COZAAR) 50 MG tablet, Take 50 mg by mouth once daily., Disp: , Rfl:     menthol (BIOFREEZE, MENTHOL,) 4 % Gel, Apply 1 application topically once daily. To painful area on the feet. (Patient not taking: Reported on 11/22/2019), Disp: 89 mL, Rfl: 15    needle, disp, 21 G (BD REGULAR BEVEL NEEDLES) 21 x 1 " Ndle, 1 Stick by Misc.(Non-Drug; Combo " Route) route as directed., Disp: 100 each, Rfl: 0    ONETOUCH DELICA LANCETS 30 gauge Misc, Inject 1 lancet into the skin 4 (four) times daily with meals and nightly., Disp: 200 each, Rfl: 11    pregabalin (LYRICA) 50 MG capsule, Take 1 capsule (50 mg total) by mouth once daily., Disp: 90 capsule, Rfl: 0    sub-q insulin device, 30 unit (VGO 30) Corry, 1 each by Misc.(Non-Drug; Combo Route) route once daily., Disp: 30 Device, Rfl: 11    sub-q insulin device, 40 unit (VGO 40) Corry, 1 Device by Misc.(Non-Drug; Combo Route) route once daily. 4 clicks with each meal. 2 clicks for snack., Disp: 30 Device, Rfl: 11    Objective:     Lab Results   Component Value Date    HGBA1C 9.8 (H) 11/22/2019     Lab Results   Component Value Date    CHOL 313 (H) 11/22/2019    HDL 54 11/22/2019    LDLCALC 211.0 (H) 11/22/2019    TRIG 240 (H) 11/22/2019    CHOLHDL 17.3 (L) 11/22/2019     Lab Results   Component Value Date     11/22/2019    K 4.2 11/22/2019    CL 99 11/22/2019    CO2 32 (H) 11/22/2019     (H) 11/22/2019    BUN 30 (H) 11/22/2019    CREATININE 1.7 (H) 11/22/2019    CALCIUM 10.3 11/22/2019    PROT 7.9 11/22/2019    ALBUMIN 3.8 11/22/2019    BILITOT 0.3 11/22/2019    ALKPHOS 88 11/22/2019    AST 17 11/22/2019    ALT 25 11/22/2019    ANIONGAP 10 11/22/2019    ESTGFRAFRICA 38.6 (A) 11/22/2019    EGFRNONAA 33.5 (A) 11/22/2019    TSH 0.953 05/29/2015      Lab Results   Component Value Date    MICALBCREAT 438.6 (H) 11/22/2019       Assessment/Plan:   As she is having midday hypoglycemia if she delays her lunch she is getting too much basal insulin.  I suggested decreasing to Vgo 20 which is what I would expect her to require based on her body weight but she does not wish to change from the Vgo 30.  Having postprandial hyperglycemia thus increase to 4 clicks w/ meals.  Will have diabetic educator help her and her friend start dexcom over the phone and will review data in 1 mo.      She will notify me of low blood  sugars under 70.    RTC in person in 3 months w/ Karen Camarena, telephone visit in 1 mo w/ me    This service was not originating from a related E/M service provided within the previous 7 days nor did  to an E/M service or procedure within the next 24 hours or my soonest available appointment.  Prevailing standard of care was able to be met in this audio-only visit.      Patient will be sent an updated my chart message regarding the changes that were made during today's telephone conversation.    The session was 21-30 minutes of medical discussion.

## 2020-04-20 NOTE — ASSESSMENT & PLAN NOTE
She is having hypoglycemic episodes if she misses a meal or does not eat a meal on time which indicates that she is on too much basal insulin.  Given her body weight she would likely need to be on AV Go 20.  We discussed this at length and I also believe that she needs less insulin now that she is consuming as many carbohydrates.  She is aware that she is at increased risk of recurrence hypoglycemia however refused to decrease to V Go 20 and prefers to stay on the go 30.  She will let us know if she has low blood sugars less than 70.  As she is having postprandial glycemia will have her increased from 3-4 cups with meals and continue to Glucose with knots.  Follow-up through telephone call with me in 1 month and in person visit with nurse practitioner in 3 months.

## 2020-04-20 NOTE — PATIENT INSTRUCTIONS
Increase to 4 clicks with meals.  I think you need to go down to the VGo 20 however it seems you are not comfortable with that right now.  If you have any low blood sugars under 70 then let me know right away.

## 2020-05-01 ENCOUNTER — CLINICAL SUPPORT (OUTPATIENT)
Dept: DIABETES | Facility: CLINIC | Age: 55
End: 2020-05-01
Payer: COMMERCIAL

## 2020-05-01 DIAGNOSIS — E11.311 DIABETIC RETINOPATHY WITH MACULAR EDEMA ASSOCIATED WITH TYPE 2 DIABETES MELLITUS, UNSPECIFIED LATERALITY, UNSPECIFIED RETINOPATHY SEVERITY: Chronic | ICD-10-CM

## 2020-05-01 PROCEDURE — G0108 DIAB MANAGE TRN  PER INDIV: HCPCS | Mod: S$GLB,,, | Performed by: DIETITIAN, REGISTERED

## 2020-05-01 PROCEDURE — G0108 PR DIAB MANAGE TRN  PER INDIV: ICD-10-PCS | Mod: S$GLB,,, | Performed by: DIETITIAN, REGISTERED

## 2020-05-01 NOTE — PROGRESS NOTES
Diabetes Education  Author: Loretta Calhoun RD, CDE  Date: 5/1/2020    Diabetes Care Management Summary  Diabetes Education Record Assessment/Progress: Initial(Freestyle Moris start - education provided by phone due to COVID-19)     Diabetes Type  Diabetes Type : Type II    Health Maintenance was reviewed today with patient. Discussed with patient importance of routine eye exams, foot exams/foot care, blood work (i.e.: A1c, microalbumin, and lipid), dental visits, yearly flu vaccine, and pneumonia vaccine as indicated by PCP. Patient verbalized understanding.     Health Maintenance Topics with due status: Not Due       Topic Last Completion Date    Lipid Panel 11/22/2019    Hemoglobin A1c 11/22/2019    Foot Exam 04/20/2020     Health Maintenance Due   Topic Date Due    TETANUS VACCINE  04/12/1982    Colonoscopy  04/12/2014    Mammogram  03/28/2015    Eye Exam  10/19/2016    High Dose Statin  10/23/2018     Monitoring   Do you use a personal continuous glucose monitor?: Yes  What kind of glucose monitor do you use?: Freestyle Moris Flash    Barriers to Change  Learning Challenges : Vision  Vision - further explanation: blind(Patient had a friend present to assist with set up)    Diabetes Education Assessment/Progress  Monitoring (monitoring blood glucose/other parameters & using results): Instructed, Discussion, Individual Session, Written Materials Provided, Demonstrates Understanding/Competency (verbalizes/demonstrates)  Instructed patient on use of the Freestyle Moris. Patient's friend inserted sensor in the back of her arm. Scanned sensor for new sensor start. Advised there is a 1 hour warm-up period before reader/sensor can be used. Sensor should be changed out every 14 days and scanned at least once every 8 hours to avoid gaps in graphs, but recommended to scan more often - before meals, 2 hours after meals and HS. Advised that Vitamin C, Aspirin and dehydration may result in less accurate readings and sensor  needs to be removed for MRI, CT or Diathermy.    Goals  Patient has selected/evaluated goals during today's session: No    Diabetes Care Plan/Intervention  Education Plan/Intervention: Individual Follow-Up DSMT    Today's Self-Management Care Plan was developed with the patient's input and is based on barriers identified during today's assessment.    The long and short-term goals in the care plan were written with the patient/caregiver's input. The patient has agreed to work toward these goals to improve her overall diabetes control.      The patient received a copy of today's self-management plan and verbalized understanding of the care plan, goals, and all of today's instructions.      The patient was encouraged to communicate with her physician and care team regarding her condition(s) and treatment.  I provided the patient with my contact information today and encouraged her to contact me via phone or patient portal as needed.     Education Units of Time   Time Spent: 60 min

## 2020-05-04 ENCOUNTER — PATIENT OUTREACH (OUTPATIENT)
Dept: DIABETES | Facility: CLINIC | Age: 55
End: 2020-05-04

## 2020-05-04 NOTE — PROGRESS NOTES
"Patient called. She started Moris on 5/1. Patient and friend wanted to know how often to scan BG. Advised to scan BG before meals, 2 hours PP and HS. Patient then asked why her BGs kept increasing today. She ate a bowl of cereal this AM and gave 12 units of Novolog before meal. She has not had anything else to eat today and BGs have gone from 200s to 300s. Asked patient if she was still using VGO. She said yes - VGO 30 with 4 clicks, but does not have VGO on today. She said she didn't put it on today because "her ribcage was sore." Advised she is not getting any basal insulin and she should place the VGO back on and do 4 clicks before each meal. Discussed alternate sites for the VGO. Patient plans to put VGO back on now. She will call with further question or concerns.  "

## 2020-05-21 ENCOUNTER — OFFICE VISIT (OUTPATIENT)
Dept: ENDOCRINOLOGY | Facility: CLINIC | Age: 55
End: 2020-05-21
Payer: COMMERCIAL

## 2020-05-21 ENCOUNTER — TELEPHONE (OUTPATIENT)
Dept: ENDOCRINOLOGY | Facility: CLINIC | Age: 55
End: 2020-05-21

## 2020-05-21 DIAGNOSIS — Z79.4 TYPE 2 DIABETES MELLITUS WITH PROLIFERATIVE RETINOPATHY OF BOTH EYES, WITH LONG-TERM CURRENT USE OF INSULIN, MACULAR EDEMA PRESENCE UNSPECIFIED, UNSPECIFIED PROLIFERATIVE RETINOPATHY TYPE: Primary | ICD-10-CM

## 2020-05-21 DIAGNOSIS — E78.2 MIXED HYPERLIPIDEMIA: Chronic | ICD-10-CM

## 2020-05-21 DIAGNOSIS — E11.3593 TYPE 2 DIABETES MELLITUS WITH PROLIFERATIVE RETINOPATHY OF BOTH EYES, WITH LONG-TERM CURRENT USE OF INSULIN, MACULAR EDEMA PRESENCE UNSPECIFIED, UNSPECIFIED PROLIFERATIVE RETINOPATHY TYPE: Primary | ICD-10-CM

## 2020-05-21 PROCEDURE — 99212 OFFICE O/P EST SF 10 MIN: CPT | Mod: 95,,, | Performed by: INTERNAL MEDICINE

## 2020-05-21 PROCEDURE — 99212 PR OFFICE/OUTPT VISIT, EST, LEVL II, 10-19 MIN: ICD-10-PCS | Mod: 95,,, | Performed by: INTERNAL MEDICINE

## 2020-05-21 NOTE — TELEPHONE ENCOUNTER
Attempt to contact PT about virtual audio visit for today, PT did not answer.    Left PT voice message.

## 2020-05-21 NOTE — PROGRESS NOTES
Subjective:    This was a telemedicine visit which took place via telephone.      The reason for the telephone service rather than a virtual visit was:   Patient is blind and cannot use portal    During the visit, I was located in [place]  and the patient was located in [place] and I had access to and was able to review their medical record.      The patient has been informed that they have chosen to received care through the use of telemedicine. Telemedicine enables to healthcare providers at different locations to provide safe, effective, and convenient care through the use of technology.  There risks associated with use of telemedicine, including equipment failures.  They also understand that I cannot physically examine them.       Patient consented to the use of telemedicine in their medical care.     Patient verbally understands the risks and benefits of telemedicine as explained.       All questions regarding telemedicine were answered.       Reason for call:  Follow-up for type 2 diabetes    Chief Complaint: Follow-up for type 2 diabetes mellitus    HPI: Estefani Sutherland is a 55 y.o. female who is here for a follow-up evaluation for type 2 diabetes mellitus    Last seen in clinic by Dr. Sharif 11/2019 after which she had professional CGM showing consistently high blood sugar on VGO 30 thus she was increased in December to Vgo 40 with 4 clicks with meals however she says that she never got the Vgo 40 and still uses the Vgo 30.      I had a telephone visit with her 1 month ago at which time she was having hypoglycemia if she did not eat meals on time indicative that she was getting too much basal insulin.  I discussed going down to V Go 20 however the patient refused.  She was having some postprandial hyperglycemia so we increased her to for clicks with meals.    Less hyperglycemia now but still having lows midday if eating lunch late    copay was too high for dexcom so she still uses freestyle.    Current  monitoring regimen: home blood tests - 4 times daily using freestyle  Home blood sugar records:   From recall:  AM: 130s  Pre-lunch/Pre-dinner: low 100 - 250   Bedtime: 160-low 200s  Any episodes of hypoglycemia?    denies  Diabetic Health Maintenance:        HbA1c < 7.0%: No        Blood pressure <130/80:  Yes        Low dose ASA?: Yes    Objective:     Lab Results   Component Value Date    HGBA1C 9.8 (H) 11/22/2019     Lab Results   Component Value Date    CHOL 313 (H) 11/22/2019    HDL 54 11/22/2019    LDLCALC 211.0 (H) 11/22/2019    TRIG 240 (H) 11/22/2019    CHOLHDL 17.3 (L) 11/22/2019     Lab Results   Component Value Date     11/22/2019    K 4.2 11/22/2019    CL 99 11/22/2019    CO2 32 (H) 11/22/2019     (H) 11/22/2019    BUN 30 (H) 11/22/2019    CREATININE 1.7 (H) 11/22/2019    CALCIUM 10.3 11/22/2019    PROT 7.9 11/22/2019    ALBUMIN 3.8 11/22/2019    BILITOT 0.3 11/22/2019    ALKPHOS 88 11/22/2019    AST 17 11/22/2019    ALT 25 11/22/2019    ANIONGAP 10 11/22/2019    ESTGFRAFRICA 38.6 (A) 11/22/2019    EGFRNONAA 33.5 (A) 11/22/2019    TSH 0.953 05/29/2015      Lab Results   Component Value Date    MICALBCREAT 438.6 (H) 11/22/2019       Assessment/Plan:   Hypoglycemia seems to have resolved so okay to continue V Go 30.  She is unable to afford the 50 dollar co-pay a monthly for the dexcom so she will continue to use the freestyle labor a with the reader.  She is unable to upload this at home so we will have her come in for an in person visit to review her blood sugar data.  She is hesitant to come in now due to the coronavirus pandemic so will schedule her to come in 3 months with labs.  I have asked her to contact our clinic if she is having low blood sugars or persistently high blood sugar.    RTC in person in 3 months w/ Karen Migue    This service was not originating from a related E/M service provided within the previous 7 days nor did  to an E/M service or procedure within the  next 24 hours or my soonest available appointment.  Prevailing standard of care was able to be met in this audio-only visit.      Patient will be sent an updated my chart message regarding the changes that were made during today's telephone conversation.    The session was 21-30 minutes of medical discussion.

## 2020-09-02 DIAGNOSIS — E11.8 TYPE 2 DIABETES MELLITUS WITH COMPLICATION: ICD-10-CM

## 2020-09-02 NOTE — TELEPHONE ENCOUNTER
Patient overdue for in person follow up.  Please schedule w/ Karen or other NP asap.      1. Type 2 diabetes mellitus with complication  - sub-q insulin device, 30 unit (VGO 30) [place]; 1 each by Misc.(Non-Drug; Combo Route) route once daily.  Dispense: 30 Device; Refill: 3    Ayaan Gan MD

## 2021-04-07 DIAGNOSIS — E11.8 TYPE 2 DIABETES MELLITUS WITH COMPLICATION: ICD-10-CM

## 2021-05-18 ENCOUNTER — OFFICE VISIT (OUTPATIENT)
Dept: OTOLARYNGOLOGY | Facility: CLINIC | Age: 56
End: 2021-05-18
Payer: MEDICAID

## 2021-05-18 VITALS
WEIGHT: 160.94 LBS | SYSTOLIC BLOOD PRESSURE: 155 MMHG | DIASTOLIC BLOOD PRESSURE: 75 MMHG | HEART RATE: 86 BPM | BODY MASS INDEX: 28.51 KG/M2 | TEMPERATURE: 97 F

## 2021-05-18 DIAGNOSIS — E11.39 BLINDNESS OF BOTH EYES DUE TO DIABETES MELLITUS: ICD-10-CM

## 2021-05-18 DIAGNOSIS — H61.21 IMPACTED CERUMEN OF RIGHT EAR: ICD-10-CM

## 2021-05-18 DIAGNOSIS — J34.2 NASAL SEPTAL DEVIATION: ICD-10-CM

## 2021-05-18 DIAGNOSIS — L04.0 ACUTE CERVICAL ADENITIS: ICD-10-CM

## 2021-05-18 DIAGNOSIS — H54.3 BLINDNESS OF BOTH EYES DUE TO DIABETES MELLITUS: ICD-10-CM

## 2021-05-18 DIAGNOSIS — H81.12 BENIGN PAROXYSMAL POSITIONAL VERTIGO OF LEFT EAR: Primary | ICD-10-CM

## 2021-05-18 PROCEDURE — 95992 CANALITH REPOSITIONING PROC: CPT | Mod: PBBFAC,PN,LT | Performed by: SPECIALIST

## 2021-05-18 PROCEDURE — 99999 PR PBB SHADOW E&M-EST. PATIENT-LVL III: ICD-10-PCS | Mod: PBBFAC,,, | Performed by: SPECIALIST

## 2021-05-18 PROCEDURE — 99204 PR OFFICE/OUTPT VISIT, NEW, LEVL IV, 45-59 MIN: ICD-10-PCS | Mod: 25,S$PBB,, | Performed by: SPECIALIST

## 2021-05-18 PROCEDURE — 69210 PR REMOVAL IMPACTED CERUMEN REQUIRING INSTRUMENTATION, UNILATERAL: ICD-10-PCS | Mod: S$PBB,,, | Performed by: SPECIALIST

## 2021-05-18 PROCEDURE — 99204 OFFICE O/P NEW MOD 45 MIN: CPT | Mod: 25,S$PBB,, | Performed by: SPECIALIST

## 2021-05-18 PROCEDURE — 69210 REMOVE IMPACTED EAR WAX UNI: CPT | Mod: PBBFAC,PN,RT | Performed by: SPECIALIST

## 2021-05-18 PROCEDURE — 69210 REMOVE IMPACTED EAR WAX UNI: CPT | Mod: S$PBB,,, | Performed by: SPECIALIST

## 2021-05-18 PROCEDURE — 99999 PR PBB SHADOW E&M-EST. PATIENT-LVL III: CPT | Mod: PBBFAC,,, | Performed by: SPECIALIST

## 2021-05-18 PROCEDURE — 95992 PR CANALITH REPOSITIONING PROCEDURE, PER DAY: ICD-10-PCS | Mod: S$PBB,,, | Performed by: SPECIALIST

## 2021-05-18 PROCEDURE — 95992 CANALITH REPOSITIONING PROC: CPT | Mod: S$PBB,,, | Performed by: SPECIALIST

## 2021-05-18 PROCEDURE — 99213 OFFICE O/P EST LOW 20 MIN: CPT | Mod: PBBFAC,PN,25 | Performed by: SPECIALIST

## 2021-05-18 RX ORDER — AMOXICILLIN 875 MG/1
875 TABLET, FILM COATED ORAL 2 TIMES DAILY
Qty: 20 TABLET | Refills: 0 | Status: SHIPPED | OUTPATIENT
Start: 2021-05-18 | End: 2021-05-28

## 2021-08-13 ENCOUNTER — TELEPHONE (OUTPATIENT)
Dept: ENDOCRINOLOGY | Facility: CLINIC | Age: 56
End: 2021-08-13

## 2021-09-29 DIAGNOSIS — E11.8 TYPE 2 DIABETES MELLITUS WITH COMPLICATION: ICD-10-CM

## 2021-11-24 ENCOUNTER — LAB VISIT (OUTPATIENT)
Dept: LAB | Facility: HOSPITAL | Age: 56
End: 2021-11-24
Payer: MEDICAID

## 2021-11-24 ENCOUNTER — OFFICE VISIT (OUTPATIENT)
Dept: ENDOCRINOLOGY | Facility: CLINIC | Age: 56
End: 2021-11-24
Payer: MEDICAID

## 2021-11-24 ENCOUNTER — TELEPHONE (OUTPATIENT)
Dept: DIABETES | Facility: CLINIC | Age: 56
End: 2021-11-24
Payer: MEDICAID

## 2021-11-24 VITALS
WEIGHT: 162.25 LBS | BODY MASS INDEX: 28.75 KG/M2 | SYSTOLIC BLOOD PRESSURE: 138 MMHG | DIASTOLIC BLOOD PRESSURE: 82 MMHG | HEIGHT: 63 IN

## 2021-11-24 DIAGNOSIS — Z79.4 TYPE 2 DIABETES MELLITUS WITH PROLIFERATIVE RETINOPATHY OF BOTH EYES, WITH LONG-TERM CURRENT USE OF INSULIN, MACULAR EDEMA PRESENCE UNSPECIFIED, UNSPECIFIED PROLIFERATIVE RETINOPATHY TYPE: ICD-10-CM

## 2021-11-24 DIAGNOSIS — N18.32 STAGE 3B CHRONIC KIDNEY DISEASE: ICD-10-CM

## 2021-11-24 DIAGNOSIS — E11.3593 TYPE 2 DIABETES MELLITUS WITH PROLIFERATIVE RETINOPATHY OF BOTH EYES, WITH LONG-TERM CURRENT USE OF INSULIN, MACULAR EDEMA PRESENCE UNSPECIFIED, UNSPECIFIED PROLIFERATIVE RETINOPATHY TYPE: Primary | ICD-10-CM

## 2021-11-24 DIAGNOSIS — E66.09 OBESITY DUE TO EXCESS CALORIES, UNSPECIFIED CLASSIFICATION, UNSPECIFIED WHETHER SERIOUS COMORBIDITY PRESENT: ICD-10-CM

## 2021-11-24 DIAGNOSIS — E11.8 TYPE 2 DIABETES MELLITUS WITH COMPLICATION: ICD-10-CM

## 2021-11-24 DIAGNOSIS — Z79.4 TYPE 2 DIABETES MELLITUS WITH PROLIFERATIVE RETINOPATHY OF BOTH EYES, WITH LONG-TERM CURRENT USE OF INSULIN, MACULAR EDEMA PRESENCE UNSPECIFIED, UNSPECIFIED PROLIFERATIVE RETINOPATHY TYPE: Primary | ICD-10-CM

## 2021-11-24 DIAGNOSIS — E78.2 MIXED HYPERLIPIDEMIA: ICD-10-CM

## 2021-11-24 DIAGNOSIS — I10 HYPERTENSION, ESSENTIAL: ICD-10-CM

## 2021-11-24 DIAGNOSIS — E11.3593 TYPE 2 DIABETES MELLITUS WITH PROLIFERATIVE RETINOPATHY OF BOTH EYES, WITH LONG-TERM CURRENT USE OF INSULIN, MACULAR EDEMA PRESENCE UNSPECIFIED, UNSPECIFIED PROLIFERATIVE RETINOPATHY TYPE: ICD-10-CM

## 2021-11-24 DIAGNOSIS — Z86.73 HISTORY OF STROKE: ICD-10-CM

## 2021-11-24 PROBLEM — E66.9 OBESITY: Status: ACTIVE | Noted: 2021-11-24

## 2021-11-24 PROBLEM — N18.9 CKD (CHRONIC KIDNEY DISEASE): Status: ACTIVE | Noted: 2021-11-24

## 2021-11-24 LAB
ALBUMIN SERPL BCP-MCNC: 3.6 G/DL (ref 3.5–5.2)
ANION GAP SERPL CALC-SCNC: 8 MMOL/L (ref 8–16)
BUN SERPL-MCNC: 26 MG/DL (ref 6–20)
C PEPTIDE SERPL-MCNC: 4.64 NG/ML (ref 0.78–5.19)
CALCIUM SERPL-MCNC: 10.2 MG/DL (ref 8.7–10.5)
CHLORIDE SERPL-SCNC: 100 MMOL/L (ref 95–110)
CHOLEST SERPL-MCNC: 290 MG/DL (ref 120–199)
CHOLEST/HDLC SERPL: 4.7 {RATIO} (ref 2–5)
CO2 SERPL-SCNC: 26 MMOL/L (ref 23–29)
CREAT SERPL-MCNC: 1.7 MG/DL (ref 0.5–1.4)
EST. GFR  (AFRICAN AMERICAN): 38 ML/MIN/1.73 M^2
EST. GFR  (NON AFRICAN AMERICAN): 33 ML/MIN/1.73 M^2
ESTIMATED AVG GLUCOSE: 283 MG/DL (ref 68–131)
GLUCOSE SERPL-MCNC: 361 MG/DL (ref 70–110)
HBA1C MFR BLD: 11.5 % (ref 4–5.6)
HDLC SERPL-MCNC: 62 MG/DL (ref 40–75)
HDLC SERPL: 21.4 % (ref 20–50)
LDLC SERPL CALC-MCNC: 166.2 MG/DL (ref 63–159)
NONHDLC SERPL-MCNC: 228 MG/DL
PHOSPHATE SERPL-MCNC: 3.2 MG/DL (ref 2.7–4.5)
POTASSIUM SERPL-SCNC: 4.9 MMOL/L (ref 3.5–5.1)
SODIUM SERPL-SCNC: 134 MMOL/L (ref 136–145)
TRIGL SERPL-MCNC: 309 MG/DL (ref 30–150)
TSH SERPL DL<=0.005 MIU/L-ACNC: 1.34 UIU/ML (ref 0.4–4)

## 2021-11-24 PROCEDURE — 80061 LIPID PANEL: CPT | Performed by: STUDENT IN AN ORGANIZED HEALTH CARE EDUCATION/TRAINING PROGRAM

## 2021-11-24 PROCEDURE — 99214 OFFICE O/P EST MOD 30 MIN: CPT | Mod: S$PBB,,, | Performed by: INTERNAL MEDICINE

## 2021-11-24 PROCEDURE — 80069 RENAL FUNCTION PANEL: CPT | Performed by: STUDENT IN AN ORGANIZED HEALTH CARE EDUCATION/TRAINING PROGRAM

## 2021-11-24 PROCEDURE — 99215 OFFICE O/P EST HI 40 MIN: CPT | Mod: PBBFAC | Performed by: STUDENT IN AN ORGANIZED HEALTH CARE EDUCATION/TRAINING PROGRAM

## 2021-11-24 PROCEDURE — 99999 PR PBB SHADOW E&M-EST. PATIENT-LVL V: CPT | Mod: PBBFAC,,, | Performed by: STUDENT IN AN ORGANIZED HEALTH CARE EDUCATION/TRAINING PROGRAM

## 2021-11-24 PROCEDURE — 86341 ISLET CELL ANTIBODY: CPT | Performed by: STUDENT IN AN ORGANIZED HEALTH CARE EDUCATION/TRAINING PROGRAM

## 2021-11-24 PROCEDURE — 99214 PR OFFICE/OUTPT VISIT, EST, LEVL IV, 30-39 MIN: ICD-10-PCS | Mod: S$PBB,,, | Performed by: INTERNAL MEDICINE

## 2021-11-24 PROCEDURE — 83036 HEMOGLOBIN GLYCOSYLATED A1C: CPT | Performed by: STUDENT IN AN ORGANIZED HEALTH CARE EDUCATION/TRAINING PROGRAM

## 2021-11-24 PROCEDURE — 84681 ASSAY OF C-PEPTIDE: CPT | Performed by: STUDENT IN AN ORGANIZED HEALTH CARE EDUCATION/TRAINING PROGRAM

## 2021-11-24 PROCEDURE — 99999 PR PBB SHADOW E&M-EST. PATIENT-LVL V: ICD-10-PCS | Mod: PBBFAC,,, | Performed by: STUDENT IN AN ORGANIZED HEALTH CARE EDUCATION/TRAINING PROGRAM

## 2021-11-24 PROCEDURE — 84443 ASSAY THYROID STIM HORMONE: CPT | Performed by: STUDENT IN AN ORGANIZED HEALTH CARE EDUCATION/TRAINING PROGRAM

## 2021-11-24 RX ORDER — INSULIN LISPRO 100 [IU]/ML
INJECTION, SOLUTION INTRAVENOUS; SUBCUTANEOUS
Qty: 30 ML | Refills: 1 | Status: SHIPPED | OUTPATIENT
Start: 2021-11-24 | End: 2022-07-22 | Stop reason: SDUPTHER

## 2021-11-24 RX ORDER — DULAGLUTIDE 0.75 MG/.5ML
0.75 INJECTION, SOLUTION SUBCUTANEOUS
Qty: 4 PEN | Refills: 11 | Status: CANCELLED | OUTPATIENT
Start: 2021-11-24 | End: 2021-12-24

## 2021-11-28 LAB — GAD65 AB SER-SCNC: 0 NMOL/L

## 2021-11-29 DIAGNOSIS — Z79.4 TYPE 2 DIABETES MELLITUS WITH PROLIFERATIVE RETINOPATHY OF BOTH EYES, WITH LONG-TERM CURRENT USE OF INSULIN, MACULAR EDEMA PRESENCE UNSPECIFIED, UNSPECIFIED PROLIFERATIVE RETINOPATHY TYPE: Primary | ICD-10-CM

## 2021-11-29 DIAGNOSIS — E11.3593 TYPE 2 DIABETES MELLITUS WITH PROLIFERATIVE RETINOPATHY OF BOTH EYES, WITH LONG-TERM CURRENT USE OF INSULIN, MACULAR EDEMA PRESENCE UNSPECIFIED, UNSPECIFIED PROLIFERATIVE RETINOPATHY TYPE: Primary | ICD-10-CM

## 2022-04-12 ENCOUNTER — TELEPHONE (OUTPATIENT)
Dept: ENDOCRINOLOGY | Facility: CLINIC | Age: 57
End: 2022-04-12
Payer: MEDICAID

## 2022-04-12 NOTE — TELEPHONE ENCOUNTER
----- Message from Yvette Stokes MA sent at 4/11/2022  5:30 PM CDT -----    ----- Message -----  From: Anali Ag  Sent: 4/11/2022   9:29 AM CDT  To: Elvia Kuo Staff    Type:  Patient Call Back    Who Called:  Estefani     What is the reqeust in detail: Pt states she isn't about to get her Diabetes supply because she hasn't seen Dr. Gan in 2 years.Please Advise     Can the clinic reply by [facility]?    Best Call Back Number: [phone]

## 2022-04-12 NOTE — TELEPHONE ENCOUNTER
S/w pt, diabetes management request resent chart notes to ship out the patient's medical supplies. Chart notes has been faxed to [phone].

## 2022-04-13 ENCOUNTER — TELEPHONE (OUTPATIENT)
Dept: ENDOCRINOLOGY | Facility: CLINIC | Age: 57
End: 2022-04-13
Payer: MEDICAID

## 2022-04-13 NOTE — TELEPHONE ENCOUNTER
Spoke with Kimberly from [facility] informed her we have not received the forms for patient CGM refills. Per Dony they will fax paperwork. Fax # given

## 2022-04-14 DIAGNOSIS — E11.3593 TYPE 2 DIABETES MELLITUS WITH PROLIFERATIVE RETINOPATHY OF BOTH EYES, WITH LONG-TERM CURRENT USE OF INSULIN, MACULAR EDEMA PRESENCE UNSPECIFIED, UNSPECIFIED PROLIFERATIVE RETINOPATHY TYPE: Primary | ICD-10-CM

## 2022-04-14 DIAGNOSIS — Z79.4 TYPE 2 DIABETES MELLITUS WITH PROLIFERATIVE RETINOPATHY OF BOTH EYES, WITH LONG-TERM CURRENT USE OF INSULIN, MACULAR EDEMA PRESENCE UNSPECIFIED, UNSPECIFIED PROLIFERATIVE RETINOPATHY TYPE: Primary | ICD-10-CM

## 2022-04-14 RX ORDER — INSULIN PUMP SYRINGE, 3 ML
EACH MISCELLANEOUS
Qty: 1 EACH | Refills: 0 | Status: SHIPPED | OUTPATIENT
Start: 2022-04-14 | End: 2026-08-07

## 2022-04-14 NOTE — TELEPHONE ENCOUNTER
----- Message from Yvette Stokes MA sent at 4/13/2022  4:52 PM CDT -----  Regarding: FW: PT Inquiry    ----- Message -----  From: Swapna Coker  Sent: 4/13/2022  11:06 AM CDT  To: Elvia Kuo Staff, Juan LESTER Staff  Subject: PT Inquiry                                       Pt advised someone was suppose to call [facility] regarding her medical supplies says shes been out since Monday  Pt says she spoke with Them this morning and was told they hadnt gotten a call and orders      [phone]

## 2022-04-18 ENCOUNTER — TELEPHONE (OUTPATIENT)
Dept: ENDOCRINOLOGY | Facility: CLINIC | Age: 57
End: 2022-04-18
Payer: MEDICAID

## 2022-04-18 NOTE — TELEPHONE ENCOUNTER
----- Message from Radha Ingram sent at 4/18/2022  8:23 AM CDT -----  Regarding: Medication  Contact: pt @ [phone]  Pt is calling because she stated that the medical supplies [employer] stated that pt needed a prescription to get her diabetes supplies. Company stated that they faxed papers to office. Pt is asking for a call back

## 2022-04-25 DIAGNOSIS — E11.8 TYPE 2 DIABETES MELLITUS WITH COMPLICATION: ICD-10-CM

## 2022-04-25 RX ORDER — SUB-Q INSULIN DEVICE, 40 UNIT
EACH MISCELLANEOUS
Qty: 30 EACH | Refills: 2 | OUTPATIENT
Start: 2022-04-25

## 2022-07-21 ENCOUNTER — TELEPHONE (OUTPATIENT)
Dept: ENDOCRINOLOGY | Facility: CLINIC | Age: 57
End: 2022-07-21
Payer: MEDICAID

## 2022-07-21 NOTE — TELEPHONE ENCOUNTER
----- Message from Yarelis Domingo sent at 7/21/2022 11:01 AM CDT -----  Regarding: Sooner Appt  Type:  Sooner Apoointment Request    Caller is requesting a sooner appointment.  Caller declined first available appointment listed below.  Caller will not accept being placed on the waitlist and is requesting a message be sent to doctor.  Name of Caller: Patient  When is the first available appointment? No appts generated   Symptoms: low glucose   Would the patient rather a call back or a response via [facility]? Call back   Best Call Back Number: [phone]  Additional Information: Patient stated she keeps eating but can not get her numbers to go up Patient would like to be seen as soon as possible Patient stated she is coming in tomorrow at 12:30 whether she has an appt or not Please Assist

## 2022-07-21 NOTE — TELEPHONE ENCOUNTER
Spoke to patient and she said her sugars or out of control and she would not get off the phone unless she was scheduled with anyone in [facility] for Friday. I have her scheduled with Dr Taylor for Friday

## 2022-07-22 ENCOUNTER — LAB VISIT (OUTPATIENT)
Dept: LAB | Facility: HOSPITAL | Age: 57
End: 2022-07-22
Payer: MEDICAID

## 2022-07-22 ENCOUNTER — OFFICE VISIT (OUTPATIENT)
Dept: ENDOCRINOLOGY | Facility: CLINIC | Age: 57
End: 2022-07-22
Payer: MEDICAID

## 2022-07-22 VITALS
SYSTOLIC BLOOD PRESSURE: 140 MMHG | HEART RATE: 98 BPM | OXYGEN SATURATION: 97 % | HEIGHT: 63 IN | WEIGHT: 160.69 LBS | DIASTOLIC BLOOD PRESSURE: 88 MMHG | BODY MASS INDEX: 28.47 KG/M2

## 2022-07-22 DIAGNOSIS — E66.09 OBESITY DUE TO EXCESS CALORIES, UNSPECIFIED CLASSIFICATION, UNSPECIFIED WHETHER SERIOUS COMORBIDITY PRESENT: ICD-10-CM

## 2022-07-22 DIAGNOSIS — N18.32 STAGE 3B CHRONIC KIDNEY DISEASE: ICD-10-CM

## 2022-07-22 DIAGNOSIS — Z79.4 TYPE 2 DIABETES MELLITUS WITH PROLIFERATIVE RETINOPATHY OF BOTH EYES, WITH LONG-TERM CURRENT USE OF INSULIN, MACULAR EDEMA PRESENCE UNSPECIFIED, UNSPECIFIED PROLIFERATIVE RETINOPATHY TYPE: ICD-10-CM

## 2022-07-22 DIAGNOSIS — E78.2 MIXED HYPERLIPIDEMIA: ICD-10-CM

## 2022-07-22 DIAGNOSIS — Z79.4 TYPE 2 DIABETES MELLITUS WITH PROLIFERATIVE RETINOPATHY OF BOTH EYES, WITH LONG-TERM CURRENT USE OF INSULIN, MACULAR EDEMA PRESENCE UNSPECIFIED, UNSPECIFIED PROLIFERATIVE RETINOPATHY TYPE: Primary | ICD-10-CM

## 2022-07-22 DIAGNOSIS — E11.3593 TYPE 2 DIABETES MELLITUS WITH PROLIFERATIVE RETINOPATHY OF BOTH EYES, WITH LONG-TERM CURRENT USE OF INSULIN, MACULAR EDEMA PRESENCE UNSPECIFIED, UNSPECIFIED PROLIFERATIVE RETINOPATHY TYPE: Primary | ICD-10-CM

## 2022-07-22 DIAGNOSIS — E11.8 TYPE 2 DIABETES MELLITUS WITH COMPLICATION: ICD-10-CM

## 2022-07-22 DIAGNOSIS — E11.3593 TYPE 2 DIABETES MELLITUS WITH PROLIFERATIVE RETINOPATHY OF BOTH EYES, WITH LONG-TERM CURRENT USE OF INSULIN, MACULAR EDEMA PRESENCE UNSPECIFIED, UNSPECIFIED PROLIFERATIVE RETINOPATHY TYPE: ICD-10-CM

## 2022-07-22 DIAGNOSIS — Z86.73 HISTORY OF STROKE: ICD-10-CM

## 2022-07-22 DIAGNOSIS — E11.311 DIABETIC RETINOPATHY WITH MACULAR EDEMA ASSOCIATED WITH TYPE 2 DIABETES MELLITUS, UNSPECIFIED LATERALITY, UNSPECIFIED RETINOPATHY SEVERITY: Chronic | ICD-10-CM

## 2022-07-22 LAB
ANION GAP SERPL CALC-SCNC: 10 MMOL/L (ref 8–16)
BASOPHILS # BLD AUTO: 0.04 K/UL (ref 0–0.2)
BASOPHILS NFR BLD: 0.3 % (ref 0–1.9)
BUN SERPL-MCNC: 21 MG/DL (ref 6–20)
CALCIUM SERPL-MCNC: 10.7 MG/DL (ref 8.7–10.5)
CHLORIDE SERPL-SCNC: 97 MMOL/L (ref 95–110)
CHOLEST SERPL-MCNC: 314 MG/DL (ref 120–199)
CHOLEST/HDLC SERPL: 6 {RATIO} (ref 2–5)
CO2 SERPL-SCNC: 29 MMOL/L (ref 23–29)
CREAT SERPL-MCNC: 1.5 MG/DL (ref 0.5–1.4)
DIFFERENTIAL METHOD: ABNORMAL
EOSINOPHIL # BLD AUTO: 0.1 K/UL (ref 0–0.5)
EOSINOPHIL NFR BLD: 0.9 % (ref 0–8)
ERYTHROCYTE [DISTWIDTH] IN BLOOD BY AUTOMATED COUNT: 13.1 % (ref 11.5–14.5)
EST. GFR  (AFRICAN AMERICAN): 44 ML/MIN/1.73 M^2
EST. GFR  (NON AFRICAN AMERICAN): 38.1 ML/MIN/1.73 M^2
ESTIMATED AVG GLUCOSE: 214 MG/DL (ref 68–131)
GLUCOSE SERPL-MCNC: 225 MG/DL (ref 70–110)
HBA1C MFR BLD: 9.1 % (ref 4–5.6)
HCT VFR BLD AUTO: 40.4 % (ref 37–48.5)
HDLC SERPL-MCNC: 52 MG/DL (ref 40–75)
HDLC SERPL: 16.6 % (ref 20–50)
HGB BLD-MCNC: 13.2 G/DL (ref 12–16)
IMM GRANULOCYTES # BLD AUTO: 0.04 K/UL (ref 0–0.04)
IMM GRANULOCYTES NFR BLD AUTO: 0.3 % (ref 0–0.5)
LDLC SERPL CALC-MCNC: 216.6 MG/DL (ref 63–159)
LYMPHOCYTES # BLD AUTO: 1.8 K/UL (ref 1–4.8)
LYMPHOCYTES NFR BLD: 14.7 % (ref 18–48)
MCH RBC QN AUTO: 27 PG (ref 27–31)
MCHC RBC AUTO-ENTMCNC: 32.7 G/DL (ref 32–36)
MCV RBC AUTO: 83 FL (ref 82–98)
MONOCYTES # BLD AUTO: 0.8 K/UL (ref 0.3–1)
MONOCYTES NFR BLD: 6.2 % (ref 4–15)
NEUTROPHILS # BLD AUTO: 9.7 K/UL (ref 1.8–7.7)
NEUTROPHILS NFR BLD: 77.6 % (ref 38–73)
NONHDLC SERPL-MCNC: 262 MG/DL
NRBC BLD-RTO: 0 /100 WBC
PLATELET # BLD AUTO: 370 K/UL (ref 150–450)
PMV BLD AUTO: 10.6 FL (ref 9.2–12.9)
POTASSIUM SERPL-SCNC: 4.5 MMOL/L (ref 3.5–5.1)
RBC # BLD AUTO: 4.89 M/UL (ref 4–5.4)
SODIUM SERPL-SCNC: 136 MMOL/L (ref 136–145)
TRIGL SERPL-MCNC: 227 MG/DL (ref 30–150)
WBC # BLD AUTO: 12.53 K/UL (ref 3.9–12.7)

## 2022-07-22 PROCEDURE — 3077F SYST BP >= 140 MM HG: CPT | Mod: CPTII,,, | Performed by: INTERNAL MEDICINE

## 2022-07-22 PROCEDURE — 80061 LIPID PANEL: CPT | Performed by: STUDENT IN AN ORGANIZED HEALTH CARE EDUCATION/TRAINING PROGRAM

## 2022-07-22 PROCEDURE — 3046F PR MOST RECENT HEMOGLOBIN A1C LEVEL > 9.0%: ICD-10-PCS | Mod: CPTII,,, | Performed by: INTERNAL MEDICINE

## 2022-07-22 PROCEDURE — 3008F BODY MASS INDEX DOCD: CPT | Mod: CPTII,,, | Performed by: INTERNAL MEDICINE

## 2022-07-22 PROCEDURE — 95251 PR GLUCOSE MONITOR, 72 HOUR, PHYS INTERP: ICD-10-PCS | Mod: ,,, | Performed by: INTERNAL MEDICINE

## 2022-07-22 PROCEDURE — 85025 COMPLETE CBC W/AUTO DIFF WBC: CPT | Performed by: STUDENT IN AN ORGANIZED HEALTH CARE EDUCATION/TRAINING PROGRAM

## 2022-07-22 PROCEDURE — 80048 BASIC METABOLIC PNL TOTAL CA: CPT | Performed by: STUDENT IN AN ORGANIZED HEALTH CARE EDUCATION/TRAINING PROGRAM

## 2022-07-22 PROCEDURE — 1160F RVW MEDS BY RX/DR IN RCRD: CPT | Mod: CPTII,,, | Performed by: INTERNAL MEDICINE

## 2022-07-22 PROCEDURE — 83036 HEMOGLOBIN GLYCOSYLATED A1C: CPT | Performed by: STUDENT IN AN ORGANIZED HEALTH CARE EDUCATION/TRAINING PROGRAM

## 2022-07-22 PROCEDURE — 99999 PR PBB SHADOW E&M-EST. PATIENT-LVL IV: CPT | Mod: PBBFAC,,, | Performed by: INTERNAL MEDICINE

## 2022-07-22 PROCEDURE — 1160F PR REVIEW ALL MEDS BY PRESCRIBER/CLIN PHARMACIST DOCUMENTED: ICD-10-PCS | Mod: CPTII,,, | Performed by: INTERNAL MEDICINE

## 2022-07-22 PROCEDURE — 99214 OFFICE O/P EST MOD 30 MIN: CPT | Mod: 25,S$PBB,, | Performed by: INTERNAL MEDICINE

## 2022-07-22 PROCEDURE — 3046F HEMOGLOBIN A1C LEVEL >9.0%: CPT | Mod: CPTII,,, | Performed by: INTERNAL MEDICINE

## 2022-07-22 PROCEDURE — 36415 COLL VENOUS BLD VENIPUNCTURE: CPT | Performed by: STUDENT IN AN ORGANIZED HEALTH CARE EDUCATION/TRAINING PROGRAM

## 2022-07-22 PROCEDURE — 3077F PR MOST RECENT SYSTOLIC BLOOD PRESSURE >= 140 MM HG: ICD-10-PCS | Mod: CPTII,,, | Performed by: INTERNAL MEDICINE

## 2022-07-22 PROCEDURE — 3079F PR MOST RECENT DIASTOLIC BLOOD PRESSURE 80-89 MM HG: ICD-10-PCS | Mod: CPTII,,, | Performed by: INTERNAL MEDICINE

## 2022-07-22 PROCEDURE — 95251 CONT GLUC MNTR ANALYSIS I&R: CPT | Mod: ,,, | Performed by: INTERNAL MEDICINE

## 2022-07-22 PROCEDURE — 99999 PR PBB SHADOW E&M-EST. PATIENT-LVL IV: ICD-10-PCS | Mod: PBBFAC,,, | Performed by: INTERNAL MEDICINE

## 2022-07-22 PROCEDURE — 3079F DIAST BP 80-89 MM HG: CPT | Mod: CPTII,,, | Performed by: INTERNAL MEDICINE

## 2022-07-22 PROCEDURE — 99214 PR OFFICE/OUTPT VISIT, EST, LEVL IV, 30-39 MIN: ICD-10-PCS | Mod: 25,S$PBB,, | Performed by: INTERNAL MEDICINE

## 2022-07-22 PROCEDURE — 3008F PR BODY MASS INDEX (BMI) DOCUMENTED: ICD-10-PCS | Mod: CPTII,,, | Performed by: INTERNAL MEDICINE

## 2022-07-22 PROCEDURE — 1159F PR MEDICATION LIST DOCUMENTED IN MEDICAL RECORD: ICD-10-PCS | Mod: CPTII,,, | Performed by: INTERNAL MEDICINE

## 2022-07-22 PROCEDURE — 99214 OFFICE O/P EST MOD 30 MIN: CPT | Mod: PBBFAC | Performed by: INTERNAL MEDICINE

## 2022-07-22 PROCEDURE — 1159F MED LIST DOCD IN RCRD: CPT | Mod: CPTII,,, | Performed by: INTERNAL MEDICINE

## 2022-07-22 RX ORDER — FUROSEMIDE 40 MG/1
40 TABLET ORAL DAILY
COMMUNITY
Start: 2022-04-28

## 2022-07-22 RX ORDER — ROSUVASTATIN CALCIUM 40 MG/1
40 TABLET, COATED ORAL DAILY
COMMUNITY
Start: 2022-03-26 | End: 2022-08-19 | Stop reason: SDUPTHER

## 2022-07-22 RX ORDER — PANTOPRAZOLE SODIUM 40 MG/1
40 TABLET, DELAYED RELEASE ORAL DAILY
COMMUNITY
Start: 2022-07-18

## 2022-07-22 RX ORDER — INSULIN LISPRO 100 [IU]/ML
INJECTION, SOLUTION INTRAVENOUS; SUBCUTANEOUS
Qty: 30 ML | Refills: 1 | Status: SHIPPED | OUTPATIENT
Start: 2022-07-22 | End: 2022-08-09 | Stop reason: SDUPTHER

## 2022-07-22 RX ORDER — LATANOPROST 50 UG/ML
1 SOLUTION/ DROPS OPHTHALMIC NIGHTLY
COMMUNITY
Start: 2022-07-10

## 2022-07-22 RX ORDER — TIZANIDINE 2 MG/1
2 TABLET ORAL 3 TIMES DAILY PRN
COMMUNITY
Start: 2022-05-06

## 2022-07-22 RX ORDER — CALCIUM CARB/VITAMIN D3/VIT K1 500-100-40
TABLET,CHEWABLE ORAL
COMMUNITY
Start: 2022-01-24 | End: 2022-07-22 | Stop reason: SDUPTHER

## 2022-07-22 RX ORDER — ERYTHROMYCIN 5 MG/G
OINTMENT OPHTHALMIC
COMMUNITY
Start: 2022-07-15

## 2022-07-22 RX ORDER — CALCIUM CARB/VITAMIN D3/VIT K1 500-100-40
TABLET,CHEWABLE ORAL
Qty: 90 EACH | Refills: 3 | Status: SHIPPED | OUTPATIENT
Start: 2022-07-22 | End: 2022-08-09 | Stop reason: SDUPTHER

## 2022-07-22 RX ORDER — LOSARTAN POTASSIUM AND HYDROCHLOROTHIAZIDE 12.5; 1 MG/1; MG/1
1 TABLET ORAL DAILY
COMMUNITY
Start: 2022-07-18

## 2022-07-22 RX ORDER — PREDNISOLONE ACETATE 10 MG/ML
SUSPENSION/ DROPS OPHTHALMIC
COMMUNITY
Start: 2022-07-15

## 2022-07-22 RX ORDER — BRINZOLAMIDE 10 MG/ML
1 SUSPENSION/ DROPS OPHTHALMIC 2 TIMES DAILY
COMMUNITY
Start: 2022-01-26

## 2022-07-22 NOTE — ASSESSMENT & PLAN NOTE
-  A1C:  11.5  11/24/2021     -  Foot exam performed today.  Diminished vibratory and tactile sensation b/l, no lesion  -  Optometry NOT seen w/i 1 year.  +retinopathy   -  LEXY ordered . On losartan 50 mg daily -- Needs urine microalbumin/Cr at next visit (pt in depends today   -  Lipids ordered.  On  Atorvastatin 40 mg daily                BG Trend: reviewed Moris report, appears to be globally high with infrequent 70s-90s. Pt not bolusing often due to fear of lows.    H/o HLD, HTN, stroke 2015, CKD3, obesity, diabetic retinopathy with blindness/vision impairmenet  Medicaid with monetary limitations impacting her care  No c/i to GLP1 or SGLT2i except for concern for worsening diabetic retinopathy     PLAN:     -  Vgo 20 and 2 clicks with your meals   -  Stop Trulicity (not tolerating)   -  Bring FS Moris reader to next visit. Notify me is >250 or <70   -  Referred to ophtho - due for visit.    -  Diet, exercise, lifestyle modifications and A1c Goals discussed     Supplies/Refills:  -  Lispro  - Need to refill Vgo -- gave pt several Vgo20 to try; she will let us know next week how her sugar is with the vgo20 (vs vgo 30 that she is currently using) -- lowering the vgo because pt afraid to use clicks due to fear of hypoglycemia. Will try lower basal and 2 clicks before meals    Labs Ordered:  -  A1C, lipids, bmp, cbc  - Needs urine microalbumin/cr next time

## 2022-07-22 NOTE — PATIENT INSTRUCTIONS
Stop Trulicity     Test out the Vgo20 -- this will be a lower dose of basal insulin given slowly throughout the day. Before every meal you should do 2 clicks.    Please call or message us next week and let us know how the Vgo20 is working for you. We want to know what your sugar is on average. We want your sugar between 150-200. This will make it less likely that you will have a low sugar. If this lower vgo is working well for you then we will need to send a new prescription, so it is important that you let us know how it is going.

## 2022-07-22 NOTE — PROGRESS NOTES
"Subjective:      Patient ID: Estefani Sutherland is a 57 y.o. female.    Chief Complaint:  DM2, HTN, HLD; Follow up     History of Present Illness    55 YO Female w/ dx of DM2, HTN, HLD and CKD3 that presents to the endocrine clinic for follow-up.    Last visit with Dr. Gan and Dr Martinez in 11/2021.  She had professional CGM, FS moris, showing consistently high blood sugar on VGO 30 thus she was increased in December 2019 to Vgo 40 with 4 clicks with meals however she says that she never got the Vgo 40 and still uses the Vgo 30. Last visit it was discussed to continue Vgo 30 with 5 clicks before meals.    Still using Vgo 30. However she took the pump off this AM around 5am and then ate some mashed potatoes because she was worried about BG going low. BG 90s this AM.  Lowest she reports is 70s, symptomatic - generalized weakness, begins to feel symptomatic at low 100s  Reports lows whether or not she eats. Only doing one click with meals although not always doing clicks with food. Frequently no clicks due to fear of lows.  Checks fingersticks frequently, and reports it does correlate with the Moris  Trulicity started in 11/2021. Pt reports "I am not digesting my food" - describes constipation and belching, no N/V    Regarding diabetes:    Initially Diagnosed with T2D:  Around age 29     Current symptoms/problems:     Endorses polyuria, numbness b/l feet, vision change  Denies any polyuria,, nocturia, nausea vomiting, abdominal discomfort, weight change  Denies h/o frequent UTIs/yeast infections  Denies h/o pancreatitis, recurrent gallstones, daily etoh use, MEN syndrome, pancreatic tumors, gastroparesis     Known diabetic complications:  +Retinopathy - blind in right eye and very poor vision in Left eye;   + nephropathy, last Cr 2.0 (GFR 32);  +Peripheral neuropathy without ulceration     Cardiovascular risk factors: obesity (BMI >= 30 kg/m2) , HLD, stroke 2015, HTN, CKD3     Current diabetic medications include: " "  1. VGO 30 and 3 clicks with meals - lispro  2. Trulicity 0.75 mg SC weekly -- not tolerating - belching and constipation     Other medications tried:  1. MFN - diarrhea  2. Glipizide       Diet: on average, 3 meals per day     BF: oatmeal     L: turkey, occ. veggies     D: largest  Meat, burger no bread, sandwich, soup     Snacks: potato chips     Exercise: none     Glucose Trends:  FS moris check 4-6 times a day    AM: 90s-120s    L: 160s    D: 180s    HS: 200s-220s     Any episodes of hypoglycemia? Infrequent on Moris report, pt reporting frequent lows     Family Hx:  Father - T2D later onset             Wt Readings from Last 10 Encounters:   07/22/22 72.9 kg (160 lb 11.5 oz)   11/24/21 73.6 kg (162 lb 4.1 oz)   05/18/21 73 kg (160 lb 15 oz)   11/22/19 71.6 kg (157 lb 15.4 oz)   08/19/19 69 kg (152 lb 3.6 oz)   11/01/17 64.8 kg (142 lb 14.4 oz)   08/02/17 64 kg (141 lb)   08/01/17 64 kg (141 lb 1.5 oz)   01/22/16 61.7 kg (136 lb)   10/23/15 60.8 kg (134 lb)       Diabetes Management Status    Statin: Taking - atorvastatin 40 mg daily  ACE/ARB: Taking - losartan 50 mg daily      Screening or Prevention Patient's value   HgA1C Testing and Control   Lab Results   Component Value Date    HGBA1C 11.5 (H) 11/24/2021        Lipid profile : 11/24/2021   LDL control Lab Results   Component Value Date    LDLCALC 166.2 (H) 11/24/2021      Nephropathy screening Lab Results   Component Value Date    LABMICR 506.0 11/24/2021     Lab Results   Component Value Date    PROTEINUA 2+ (A) 01/22/2016      Blood pressure BP Readings from Last 1 Encounters:   11/24/21 138/82      Dilated retinal exam : 10/19/2015   Foot exam   : 11/24/2021      ROS:   As above    Objective:     BP (!) 140/88 (BP Location: Left arm, Patient Position: Sitting, BP Method: Medium (Manual))   Pulse 98   Ht 5' 3" (1.6 m)   Wt 72.9 kg (160 lb 11.5 oz)   SpO2 97%   BMI 28.47 kg/m²   BP Readings from Last 3 Encounters:   07/22/22 (!) 140/88   11/24/21 " 138/82   05/18/21 (!) 155/75     Physical Exam  Vitals and nursing note reviewed.   Constitutional:       Appearance: well-developed.  obese.   HENT:      Head: Normocephalic and atraumatic.   Neck:      Thyroid: No thyromegaly.      Trachea: No tracheal deviation.   Cardiovascular:      Rate and Rhythm: Normal rate.      Pulses: Normal pulses.   Pulmonary:      Effort: Pulmonary effort is normal.   Abdominal:      Comments: Insulin injection sites examined on abdomen, clean, no redness, no nodule/masses   Musculoskeletal:      Right lower leg: No edema.      Left lower leg: No edema.   Skin:     General: Skin is warm.   Neurological:      Mental Status: alert and oriented to person, place, and time.   Wt Readings from Last 1 Encounters:   07/22/22 1049 72.9 kg (160 lb 11.5 oz)     Body mass index is 28.47 kg/m².      Lab Review:   Lab Results   Component Value Date    HGBA1C 11.5 (H) 11/24/2021     Lab Results   Component Value Date    CHOL 290 (H) 11/24/2021    HDL 62 11/24/2021    LDLCALC 166.2 (H) 11/24/2021    TRIG 309 (H) 11/24/2021    CHOLHDL 21.4 11/24/2021     Lab Results   Component Value Date     (L) 11/24/2021    K 4.9 11/24/2021     11/24/2021    CO2 26 11/24/2021     (H) 11/24/2021    BUN 26 (H) 11/24/2021    CREATININE 1.7 (H) 11/24/2021    CALCIUM 10.2 11/24/2021    PROT 7.9 11/22/2019    ALBUMIN 3.6 11/24/2021    BILITOT 0.3 11/22/2019    ALKPHOS 88 11/22/2019    AST 17 11/22/2019    ALT 25 11/22/2019    ANIONGAP 8 11/24/2021    ESTGFRAFRICA 38.0 (A) 11/24/2021    EGFRNONAA 33.0 (A) 11/24/2021    TSH 1.337 11/24/2021     No results found for: [id number]    Assessment and Plan     Type 2 diabetes mellitus with ophthalmic complication, with long-term current use of insulin  -  A1C:  11.5  11/24/2021     -  Foot exam performed today.  Diminished vibratory and tactile sensation b/l, no lesion  -  Optometry NOT seen w/i 1 year.  +retinopathy   -  LEXY ordered . On losartan 50 mg daily  -- Needs urine microalbumin/Cr at next visit (pt in depends today   -  Lipids ordered.  On  Atorvastatin 40 mg daily                BG Trend: reviewed Moris report, appears to be globally high with infrequent 70s-90s. Pt not bolusing often due to fear of lows.    H/o HLD, HTN, stroke 2015, CKD3, obesity, diabetic retinopathy with blindness/vision impairmenet  Medicaid with monetary limitations impacting her care  No c/i to GLP1 or SGLT2i except for concern for worsening diabetic retinopathy     PLAN:     -  Vgo 20 and 2 clicks with your meals   -  Stop Trulicity (not tolerating)   -  Bring FS Moris reader to next visit. Notify me is >250 or <70   -  Referred to ophtho - due for visit.    -  Diet, exercise, lifestyle modifications and A1c Goals discussed     Supplies/Refills:  -  Lispro  - Need to refill Vgo -- gave pt several Vgo20 to try; she will let us know next week how her sugar is with the vgo20 (vs vgo 30 that she is currently using) -- lowering the vgo because pt afraid to use clicks due to fear of hypoglycemia. Will try lower basal and 2 clicks before meals    Labs Ordered:  -  A1C, lipids, bmp, cbc  - Needs urine microalbumin/cr next time      Mixed hyperlipidemia  Continue statin, repeat lipid panel ordered     Diabetic retinopathy  Optimize glycemic control  Pt due for retinal exam - reminded her of this    CKD (chronic kidney disease)  Continue ARB  Due for LEXY - pt declines today, needs next time  BMP today      RTC in 1-2 months      Kristin Shearer MD  [facility], [address]th [address]  [address] [place], [place], [address]    Office: [phone]  Fax: [phone]

## 2022-07-24 NOTE — PROGRESS NOTES
I have reviewed and concur with Dr. Shearer's history, physical, assessment, and plan.  I have personally interviewed and examined the patient.    T2DM on VGo and Moris. She is ocncernd about hypoglycemia although reports symptoms at 100 and few true events on Moris. However she is taking off VGo and eating when suagr 100 to prevent low  Also with GI upset likely due to Trulicity. Will stop Trulicity and do trial of reducing VGo to 20 units but with caveat that she will have to leave this in place and do clicks with meals to prevent hyperglycemia. She agrees to try this  Can relax glucose goals to help improve compliance with dosing. As control improves may be able to lower these    Myra Taylor MD

## 2022-07-25 ENCOUNTER — TELEPHONE (OUTPATIENT)
Dept: ENDOCRINOLOGY | Facility: CLINIC | Age: 57
End: 2022-07-25
Payer: MEDICAID

## 2022-07-25 NOTE — TELEPHONE ENCOUNTER
----- Message from Myra Taylor MD sent at 7/25/2022  1:21 PM CDT -----  Can you please call her and let her know the labs show that cholesterol is very high. Please ask if she is taking the crestor (rosuvastatin) that is prescribed. If not she needs to restart

## 2022-07-25 NOTE — TELEPHONE ENCOUNTER
Spoke with patient. Confirmed that she is taking the crestor 40 mg once daily. Explained to patient that her cholesterol levels are very high and will let dr scruggs know so she can determine if any changes need to be made to cholesterol meds    Pt also states she is still having blood sugar lows whether or not she is eating. States she was supposed to let us know this week after she had her visit last week.    Please advise. Pt also reminded about appt next month on aug 17 and the importance of coming to that appt.

## 2022-07-26 ENCOUNTER — NURSE TRIAGE (OUTPATIENT)
Dept: ADMINISTRATIVE | Facility: CLINIC | Age: 57
End: 2022-07-26
Payer: MEDICAID

## 2022-07-26 RX ORDER — FLASH GLUCOSE SENSOR
1 KIT MISCELLANEOUS
Qty: 2 KIT | Refills: 11 | Status: SHIPPED | OUTPATIENT
Start: 2022-07-26 | End: 2022-11-09 | Stop reason: SDUPTHER

## 2022-07-26 NOTE — TELEPHONE ENCOUNTER
Pt reports she needs her prescription sent in for her diabetic sensor. Please follow up in this regard.     Reason for Disposition   Prescription refill request for ESSENTIAL medicine (i.e., likelihood of harm to patient if not taken) and triager unable to refill per department policy    Protocols used: MEDICATION QUESTION CALL-A-[place]

## 2022-07-27 ENCOUNTER — NURSE TRIAGE (OUTPATIENT)
Dept: ADMINISTRATIVE | Facility: CLINIC | Age: 57
End: 2022-07-27
Payer: MEDICAID

## 2022-07-27 NOTE — TELEPHONE ENCOUNTER
Patient contacted the Nurse on Call Triage Line for follow-up regarding call made earlier for prescription submission to new Pharmacy.  Message sent to patient's Provider @ 12:18 PM by prior Triage RN.     Follow-up Secure Message sent by Triage RN to Dr. Myra Taylor for assistance with submission of patient's prescription for Sensors.    Response/Reply received by Dr. Taylor and Maye Moon MA that states the form for patient's prescription has been completed and awaiting signature for submission to [facility] with call to update patient on today.     Triage RN advised patient that Dr. Taylor has received the message and will submit prescription for Freestyle Moris sensors on today.     Reason for Disposition   Nursing judgment    Additional Information   Negative: Nursing judgment   Negative: Nursing judgment   Negative: Nursing judgment    Protocols used: INFORMATION ONLY CALL - NO TRIAGE-A-[place]

## 2022-07-27 NOTE — TELEPHONE ENCOUNTER
Call attempted, no answer.  Rx was sent to pt pharmacy for casey sensors yesterday afternoon. Was not aware pt using supply company as this information is not in her chart. Will send paperwork to [facility] by end of day today.

## 2022-07-27 NOTE — TELEPHONE ENCOUNTER
Patient states [facility] has not received prescription for sensors.  Asked patient to verify if she would like prescription sent to [facility].  She then stated she is not sure and wants to sent to "[employer].  The contact information patient gave was [phone].  Spoke with Yvette at [facility] pharmacy whom states sensors are not covered by the insurance.  The OOP cost for reader that goes with the sensors are $84.99 and the sensors itself are $78.99 a box that will last for 14 days.  Patient given this information she states prescription has to be sent to "[employer].  Informed patient that message would be sent to office for follow up.  Advised patient to call back if no response form office in an hour.  Patient verbalized understanding.     Reason for Disposition   Caller has URGENT medicine question about med that PCP or specialist prescribed and triager unable to answer question    Protocols used: MEDICATION QUESTION CALL-A-OH

## 2022-08-09 ENCOUNTER — TELEPHONE (OUTPATIENT)
Dept: ENDOCRINOLOGY | Facility: CLINIC | Age: 57
End: 2022-08-09
Payer: MEDICAID

## 2022-08-09 DIAGNOSIS — E11.8 TYPE 2 DIABETES MELLITUS WITH COMPLICATION: ICD-10-CM

## 2022-08-09 RX ORDER — INSULIN LISPRO 100 [IU]/ML
INJECTION, SOLUTION INTRAVENOUS; SUBCUTANEOUS
Qty: 30 ML | Refills: 1 | Status: SHIPPED | OUTPATIENT
Start: 2022-08-09 | End: 2022-11-02 | Stop reason: SDUPTHER

## 2022-08-09 RX ORDER — CALCIUM CARB/VITAMIN D3/VIT K1 500-100-40
TABLET,CHEWABLE ORAL
Qty: 90 EACH | Refills: 3 | Status: SHIPPED | OUTPATIENT
Start: 2022-08-09

## 2022-08-09 RX ORDER — SUB-Q INSULIN DEVICE, 40 UNIT
1 EACH MISCELLANEOUS DAILY
Qty: 30 EACH | Refills: 5 | Status: SHIPPED | OUTPATIENT
Start: 2022-08-09 | End: 2022-10-12 | Stop reason: SDUPTHER

## 2022-08-09 NOTE — TELEPHONE ENCOUNTER
Called pt to follow-up after last appt. We gave her samples of Vgo20 to try with 2 clicks before meals. Tod states when she did this BG 250s-300s. She ran out of the sample vgo20s, so now back to Vgo30. She is doing 3-4 clicks with meals and BG 100s-120s. Reports overnight BG low 100s on vgo30 as well, without additional clicks. States she feels symptomatic when BG is low 100s. We discussed going back to Vgo 20 but to increase clicks with meals. She will try 3 first and BG still high she will go up to 4 clicks. I will send Rx for Vgo20 now to her pharmacy. All questions and concerns addressed.       Kristin Shearer MD  [facility], 6th Floor  [address] [place], [place], [address]    Office: [phone]  Fax: [phone]

## 2022-08-16 ENCOUNTER — TELEPHONE (OUTPATIENT)
Dept: ENDOCRINOLOGY | Facility: CLINIC | Age: 57
End: 2022-08-16
Payer: MEDICAID

## 2022-08-16 NOTE — TELEPHONE ENCOUNTER
----- Message from Maye Moon MA sent at 8/15/2022  4:27 PM CDT -----  Regarding: FW: Questions and concerns  Contact: [phone]    ----- Message -----  From: Miranda Delcid  Sent: 8/15/2022   8:01 AM CDT  To: Brandon Renteria Staff  Subject: Questions and concerns                           Patient Estefani is calling. Patient has questions about her appt scheduled for 8/17.   Please call patient at [phone]      Thank You

## 2022-08-19 ENCOUNTER — TELEPHONE (OUTPATIENT)
Dept: ENDOCRINOLOGY | Facility: HOSPITAL | Age: 57
End: 2022-08-19
Payer: MEDICAID

## 2022-08-19 DIAGNOSIS — E66.3 OVERWEIGHT (BMI 25.0-29.9): ICD-10-CM

## 2022-08-19 DIAGNOSIS — E11.3593 TYPE 2 DIABETES MELLITUS WITH PROLIFERATIVE RETINOPATHY OF BOTH EYES, WITH LONG-TERM CURRENT USE OF INSULIN, MACULAR EDEMA PRESENCE UNSPECIFIED, UNSPECIFIED PROLIFERATIVE RETINOPATHY TYPE: Primary | ICD-10-CM

## 2022-08-19 DIAGNOSIS — Z79.4 TYPE 2 DIABETES MELLITUS WITH PROLIFERATIVE RETINOPATHY OF BOTH EYES, WITH LONG-TERM CURRENT USE OF INSULIN, MACULAR EDEMA PRESENCE UNSPECIFIED, UNSPECIFIED PROLIFERATIVE RETINOPATHY TYPE: Primary | ICD-10-CM

## 2022-08-19 DIAGNOSIS — N18.32 STAGE 3B CHRONIC KIDNEY DISEASE: ICD-10-CM

## 2022-08-19 RX ORDER — ROSUVASTATIN CALCIUM 40 MG/1
40 TABLET, COATED ORAL DAILY
Qty: 90 TABLET | Refills: 3 | Status: SHIPPED | OUTPATIENT
Start: 2022-08-19

## 2022-08-19 RX ORDER — DULAGLUTIDE 0.75 MG/.5ML
0.75 INJECTION, SOLUTION SUBCUTANEOUS
Qty: 4 PEN | Refills: 5 | Status: SHIPPED | OUTPATIENT
Start: 2022-08-19 | End: 2022-11-02

## 2022-08-19 NOTE — TELEPHONE ENCOUNTER
"Spoke to Ms Sutherland as she missed her appt with Dr Taylor this week. Pt states she is using the Vgo20 with 2 clicks before meals. She reports BG "much better".  She reports fasting this AM was 129 and after breakfast . Not having lows like she reported on vgo30. When asked about Crestor, she reports she is taking "samples" of crestor. Not sure if she was given samples by another provider, but we discussed that I will send a new Rx to her pharmacy. Also, she reports that she restarted Trylicity 0.75mg last week. It was stopped 7/2022 due to nausea/GI sx. Ms Jara states she discovered what was causing her nausea/indigestion, and it was not Trulicity, so she would like it refilled. I will also refill this Rx for her. She requested a Moris refill. She gets her Moris sensors from a medical [employer] company - I told her that she needs to call the [employer] company and request more sensors.     Lastly, we discussed that she needs to keep her appointments in order to best care for her. She is willing to come back for a f/u appt in early November. Will ask MA to assist in scheduling the appt. Ms Jara stated understanding of everything we discussed. I discussed this plan with Dr Taylor.      Kristin Shearer MD  [facility], 6th Floor  [address] [place], [place], [address]    Office: [phone]  Fax: [phone]          "

## 2022-10-12 DIAGNOSIS — E11.8 TYPE 2 DIABETES MELLITUS WITH COMPLICATION: ICD-10-CM

## 2022-10-12 RX ORDER — SUB-Q INSULIN DEVICE, 40 UNIT
1 EACH MISCELLANEOUS DAILY
Qty: 30 EACH | Refills: 5 | Status: SHIPPED | OUTPATIENT
Start: 2022-10-12 | End: 2023-06-14 | Stop reason: SDUPTHER

## 2022-10-12 NOTE — PROGRESS NOTES
Last time I spoke to this pt we lowered her to vgo 20 because pt consistently not bolusing for meals due to fear of lows. Decision to lower basal to vgo 20 with agreement that pt would begin bolusing for meals. Received refill request for vgo 30. [facility] spoke to the pt to clarify if she was using vgo 20 or 30, she was unsure but requested we refill 20. Will send refill for Vgo20 to her pharmacy now. Pt scheduled to see Dr Taylor on 11/2/2022.    Kristin Shearer MD  [facility] 6th [facility]  [address] [place], [place], [address]    Office: [phone]  Fax: [phone]

## 2022-11-02 ENCOUNTER — OFFICE VISIT (OUTPATIENT)
Dept: ENDOCRINOLOGY | Facility: CLINIC | Age: 57
End: 2022-11-02
Payer: MEDICAID

## 2022-11-02 VITALS
HEART RATE: 70 BPM | DIASTOLIC BLOOD PRESSURE: 78 MMHG | RESPIRATION RATE: 16 BRPM | BODY MASS INDEX: 29.29 KG/M2 | WEIGHT: 165.38 LBS | OXYGEN SATURATION: 99 % | SYSTOLIC BLOOD PRESSURE: 130 MMHG

## 2022-11-02 DIAGNOSIS — E11.311 DIABETIC RETINOPATHY WITH MACULAR EDEMA ASSOCIATED WITH TYPE 2 DIABETES MELLITUS, UNSPECIFIED LATERALITY, UNSPECIFIED RETINOPATHY SEVERITY: Chronic | ICD-10-CM

## 2022-11-02 DIAGNOSIS — E11.3593 TYPE 2 DIABETES MELLITUS WITH PROLIFERATIVE RETINOPATHY OF BOTH EYES, WITH LONG-TERM CURRENT USE OF INSULIN, MACULAR EDEMA PRESENCE UNSPECIFIED, UNSPECIFIED PROLIFERATIVE RETINOPATHY TYPE: ICD-10-CM

## 2022-11-02 DIAGNOSIS — H54.3 BLINDNESS OF BOTH EYES DUE TO DIABETES MELLITUS: ICD-10-CM

## 2022-11-02 DIAGNOSIS — E11.39 BLINDNESS OF BOTH EYES DUE TO DIABETES MELLITUS: ICD-10-CM

## 2022-11-02 DIAGNOSIS — E78.2 MIXED HYPERLIPIDEMIA: ICD-10-CM

## 2022-11-02 DIAGNOSIS — E11.8 TYPE 2 DIABETES MELLITUS WITH COMPLICATION: Primary | ICD-10-CM

## 2022-11-02 DIAGNOSIS — N18.32 STAGE 3B CHRONIC KIDNEY DISEASE: ICD-10-CM

## 2022-11-02 DIAGNOSIS — Z79.4 TYPE 2 DIABETES MELLITUS WITH PROLIFERATIVE RETINOPATHY OF BOTH EYES, WITH LONG-TERM CURRENT USE OF INSULIN, MACULAR EDEMA PRESENCE UNSPECIFIED, UNSPECIFIED PROLIFERATIVE RETINOPATHY TYPE: ICD-10-CM

## 2022-11-02 PROCEDURE — 1160F PR REVIEW ALL MEDS BY PRESCRIBER/CLIN PHARMACIST DOCUMENTED: ICD-10-PCS | Mod: CPTII,,, | Performed by: INTERNAL MEDICINE

## 2022-11-02 PROCEDURE — 99999 PR PBB SHADOW E&M-EST. PATIENT-LVL IV: CPT | Mod: PBBFAC,,, | Performed by: INTERNAL MEDICINE

## 2022-11-02 PROCEDURE — 99214 OFFICE O/P EST MOD 30 MIN: CPT | Mod: PBBFAC | Performed by: INTERNAL MEDICINE

## 2022-11-02 PROCEDURE — 1159F MED LIST DOCD IN RCRD: CPT | Mod: CPTII,,, | Performed by: INTERNAL MEDICINE

## 2022-11-02 PROCEDURE — 3075F SYST BP GE 130 - 139MM HG: CPT | Mod: CPTII,,, | Performed by: INTERNAL MEDICINE

## 2022-11-02 PROCEDURE — 99999 PR PBB SHADOW E&M-EST. PATIENT-LVL IV: ICD-10-PCS | Mod: PBBFAC,,, | Performed by: INTERNAL MEDICINE

## 2022-11-02 PROCEDURE — 3046F HEMOGLOBIN A1C LEVEL >9.0%: CPT | Mod: CPTII,,, | Performed by: INTERNAL MEDICINE

## 2022-11-02 PROCEDURE — 3008F BODY MASS INDEX DOCD: CPT | Mod: CPTII,,, | Performed by: INTERNAL MEDICINE

## 2022-11-02 PROCEDURE — 3008F PR BODY MASS INDEX (BMI) DOCUMENTED: ICD-10-PCS | Mod: CPTII,,, | Performed by: INTERNAL MEDICINE

## 2022-11-02 PROCEDURE — 3078F PR MOST RECENT DIASTOLIC BLOOD PRESSURE < 80 MM HG: ICD-10-PCS | Mod: CPTII,,, | Performed by: INTERNAL MEDICINE

## 2022-11-02 PROCEDURE — 99214 PR OFFICE/OUTPT VISIT, EST, LEVL IV, 30-39 MIN: ICD-10-PCS | Mod: S$PBB,,, | Performed by: INTERNAL MEDICINE

## 2022-11-02 PROCEDURE — 99214 OFFICE O/P EST MOD 30 MIN: CPT | Mod: S$PBB,,, | Performed by: INTERNAL MEDICINE

## 2022-11-02 PROCEDURE — 3046F PR MOST RECENT HEMOGLOBIN A1C LEVEL > 9.0%: ICD-10-PCS | Mod: CPTII,,, | Performed by: INTERNAL MEDICINE

## 2022-11-02 PROCEDURE — 3075F PR MOST RECENT SYSTOLIC BLOOD PRESS GE 130-139MM HG: ICD-10-PCS | Mod: CPTII,,, | Performed by: INTERNAL MEDICINE

## 2022-11-02 PROCEDURE — 1159F PR MEDICATION LIST DOCUMENTED IN MEDICAL RECORD: ICD-10-PCS | Mod: CPTII,,, | Performed by: INTERNAL MEDICINE

## 2022-11-02 PROCEDURE — 1160F RVW MEDS BY RX/DR IN RCRD: CPT | Mod: CPTII,,, | Performed by: INTERNAL MEDICINE

## 2022-11-02 PROCEDURE — 3078F DIAST BP <80 MM HG: CPT | Mod: CPTII,,, | Performed by: INTERNAL MEDICINE

## 2022-11-02 RX ORDER — DULAGLUTIDE 1.5 MG/.5ML
1.5 INJECTION, SOLUTION SUBCUTANEOUS WEEKLY
Qty: 4 PEN | Refills: 11 | Status: SHIPPED | OUTPATIENT
Start: 2022-11-02 | End: 2023-08-01 | Stop reason: SDUPTHER

## 2022-11-02 RX ORDER — INSULIN LISPRO 100 [IU]/ML
INJECTION, SOLUTION INTRAVENOUS; SUBCUTANEOUS
Qty: 90 ML | Refills: 3 | Status: SHIPPED | OUTPATIENT
Start: 2022-11-02 | End: 2023-05-30 | Stop reason: SDUPTHER

## 2022-11-02 NOTE — PROGRESS NOTES
Subjective:      Patient ID: Estefani Sutherland is a 57 y.o. female.    Chief Complaint:  DM2, HTN, HLD; Follow up     History of Present Illness    Regarding diabetes:    Initially Diagnosed with T2D:  Around age 29  At last visit changed from Vgo 30 to Vgo 20 because she was scared of lows and was often taking of Vgo to avoid lows.   She reports sugars have been better (mid-100-200's) since the change    Forgot moris reader today. Getting message that it is not compatible and I think because she has Moris 2 sensors but 14 day reader. She has called [employer] who is sending another  She cannot see glucose on reader without assistance but has a device which she can use and says it tells her number  Does not have smartphone and does not want Dexcom due to cost     Known diabetic complications:  +Retinopathy - blind in right eye and very poor vision in Left eye;   + nephropathy, last Cr 2.0 (GFR 32);  +Peripheral neuropathy without ulceration     Cardiovascular risk factors: obesity (BMI >= 30 kg/m2) , HLD, stroke 2015, HTN, CKD3     Current diabetic medications include:   VGO 20 and 3 clicks with meals, 2 with snacks - lispro  Trulicity 0.75 mg SC weekly      Other medications tried:  MFN - diarrhea  Glipizide       Diet: on average, 3 meals per day     BF: oatmeal     L: turkey, occ. veggies     D: largest  Meat, burger no bread, sandwich, soup     Snacks: potato chips     Exercise: none     Glucose Trends: using Moris, forgot reader    AM: 90s-120s    L: 160s    D: 180s    HS: 200s-220s     Any episodes of hypoglycemia? denies     Family Hx:  Father - T2D later onset             Wt Readings from Last 10 Encounters:   11/02/22 75 kg (165 lb 5.5 oz)   07/22/22 72.9 kg (160 lb 11.5 oz)   11/24/21 73.6 kg (162 lb 4.1 oz)   05/18/21 73 kg (160 lb 15 oz)   11/22/19 71.6 kg (157 lb 15.4 oz)   08/19/19 69 kg (152 lb 3.6 oz)   11/01/17 64.8 kg (142 lb 14.4 oz)   08/02/17 64 kg (141 lb)   08/01/17 64 kg (141 lb 1.5 oz)   01/22/16  61.7 kg (136 lb)       Diabetes Management Status    Statin: Taking - crestor 40 mg daily- unclear how consistently she is taking this  ACE/ARB: Taking - losartan 50 mg daily      Screening or Prevention Patient's value   HgA1C Testing and Control   Lab Results   Component Value Date    HGBA1C 9.1 (H) 07/22/2022        Lipid profile : 07/22/2022   LDL control Lab Results   Component Value Date    LDLCALC 216.6 (H) 07/22/2022      Nephropathy screening Lab Results   Component Value Date    LABMICR 506.0 11/24/2021     Lab Results   Component Value Date    PROTEINUA 2+ (A) 01/22/2016      Blood pressure BP Readings from Last 1 Encounters:   11/02/22 130/78      Dilated retinal exam : 10/19/2015       Objective:     /78   Pulse 70   Resp 16   Wt 75 kg (165 lb 5.5 oz)   SpO2 99%   BMI 29.29 kg/m²   BP Readings from Last 3 Encounters:   11/02/22 130/78   07/22/22 (!) 140/88   11/24/21 138/82       Wt Readings from Last 1 Encounters:   11/02/22 1116 75 kg (165 lb 5.5 oz)     Body mass index is 29.29 kg/m².      Lab Review:   Lab Results   Component Value Date    HGBA1C 9.1 (H) 07/22/2022     Lab Results   Component Value Date    CHOL 314 (H) 07/22/2022    HDL 52 07/22/2022    LDLCALC 216.6 (H) 07/22/2022    TRIG 227 (H) 07/22/2022    CHOLHDL 16.6 (L) 07/22/2022     Lab Results   Component Value Date     07/22/2022    K 4.5 07/22/2022    CL 97 07/22/2022    CO2 29 07/22/2022     (H) 07/22/2022    BUN 21 (H) 07/22/2022    CREATININE 1.5 (H) 07/22/2022    CALCIUM 10.7 (H) 07/22/2022    PROT 7.9 11/22/2019    ALBUMIN 3.6 11/24/2021    BILITOT 0.3 11/22/2019    ALKPHOS 88 11/22/2019    AST 17 11/22/2019    ALT 25 11/22/2019    ANIONGAP 10 07/22/2022    ESTGFRAFRICA 44.0 (A) 07/22/2022    EGFRNONAA 38.1 (A) 07/22/2022    TSH 1.337 11/24/2021     No results found for: FWHOGHVY33HP    Assessment and Plan     Type 2 diabetes mellitus with ophthalmic complication, with long-term current use of  insulin  Unfortunately no data to review today although glucoses seem to be similar to previous values  Reasonable goal 100-250 with avoidance of lows  Agree with changing to Moris 2 for alarms. Unable to use 3 as no smartphone    Patient Instructions   For Moris you need the blue Moris 2 reader to go with Moris 2 sensors    Increase to 4 clicks with meals  Continue 2 clicks with snacks    Increase Trulicity to 1.5 mg weekly  I would like to start SGLT2 but renal function has fluctuated and need update. She refuses labs today but says will get before next visit    CKD (chronic kidney disease)  As above  Refer to nephrology and once get updated labs trial SGLT2i    Blindness of both eyes due to diabetes mellitus  As above  Discussed technology to more safely monitor sugars. Ideal would be dexcom with phone which can tell her glucose and family following but she does not have smartphone and does not want Dexcom device  Will change to Moris 2 for alarms and states she is rarely alone and can use assistive device to get sugar when needed    Diabetic retinopathy  As above    Mixed hyperlipidemia  Unclear compliance with crestor. Discussed importance of this and will update lipids next labs  Will likely need another agent but she is hesitant about adding new medication at this time      RTC in 1-2 months

## 2022-11-02 NOTE — ASSESSMENT & PLAN NOTE
As above  Discussed technology to more safely monitor sugars. Ideal would be dexcom with phone which can tell her glucose and family following but she does not have smartphone and does not want Dexcom device  Will change to [employer] 2 for alarms and states she is rarely alone and can use assistive device to get sugar when needed

## 2022-11-02 NOTE — ASSESSMENT & PLAN NOTE
Unfortunately no data to review today although glucoses seem to be similar to previous values  Reasonable goal 100-250 with avoidance of lows  Agree with changing to Moris 2 for alarms. Unable to use 3 as no smartphone    Patient Instructions   For Moris you need the blue Moris 2 reader to go with Moris 2 sensors    Increase to 4 clicks with meals  Continue 2 clicks with snacks    Increase Trulicity to 1.5 mg weekly  I would like to start SGLT2 but renal function has fluctuated and need update. She refuses labs today but says will get before next visit

## 2022-11-02 NOTE — ASSESSMENT & PLAN NOTE
Unclear compliance with crestor. Discussed importance of this and will update lipids next labs  Will likely need another agent but she is hesitant about adding new medication at this time

## 2022-11-02 NOTE — PATIENT INSTRUCTIONS
For Moris you need the blue Moris 2 reader to go with Moris 2 sensors    Increase to 4 clicks with meals  Continue 2 clicks with snacks

## 2022-11-04 ENCOUNTER — TELEPHONE (OUTPATIENT)
Dept: ENDOCRINOLOGY | Facility: CLINIC | Age: 57
End: 2022-11-04
Payer: MEDICAID

## 2022-11-04 NOTE — TELEPHONE ENCOUNTER
Pt did not answer. Left msg explaining that I spoke with david and asked them to send us a prescription form so we can fill out and send back. They said they would be sending today. Confirmed they have the correct fax number.

## 2022-11-04 NOTE — TELEPHONE ENCOUNTER
----- Message from Rosa Shah sent at 11/4/2022 11:08 AM CDT -----  Regarding: Prescription Needed  Contact: @[phone]  Pt requesting a glucose reader 2 (blue) prescription sent to [facility].  Pt would like to be called back today as she cannot wait 3 weeks.  I asked for phone number for [facility] but patient did not have the number.  Pt states her sensory is not on her arm and she does not have a reader.  Please call to discuss further.  Pt states if no one calls her back she will come to the office Monday.

## 2022-11-04 NOTE — TELEPHONE ENCOUNTER
----- Message from Rosa Shah sent at 11/4/2022 11:08 AM CDT -----  Regarding: Prescription Needed  Contact: @[phone]  Pt requesting a glucose reader 2 (blue) prescription sent to [facility].  Pt would like to be called back today as she cannot wait 3 weeks.  I asked for phone number for [facility] but patient did not have the number.  Pt states her sensory is not on her arm and she does not have a reader.  Please call to discuss further.  Pt states if no one calls her back she will come to the office Monday.

## 2022-11-07 ENCOUNTER — TELEPHONE (OUTPATIENT)
Dept: ENDOCRINOLOGY | Facility: CLINIC | Age: 57
End: 2022-11-07
Payer: MEDICAID

## 2022-11-07 NOTE — TELEPHONE ENCOUNTER
----- Message from Zachary Black MA sent at 11/7/2022  9:55 AM CST -----  Contact: [phone]  Pt was last seen by Dr. Taylor  ----- Message -----  From: Aiden Liang MA  Sent: 11/7/2022   9:12 AM CST  To: Elvia Kuo Staff      Type:  RX Refill Request      Who Called: Berry [facility]      Refill or New Rx:refill      RX Name and Strength:flash glucose sensor (FREESTYLE THEO 2 SENSOR) Kit      Preferred Pharmacy with phone number:  [facility]      Local or Mail Order:      Would the patient rather a call back or a response via [facility]?      Best Call Back Number: [phone]      Additional Information:

## 2022-11-07 NOTE — TELEPHONE ENCOUNTER
I spoke with david on Friday afternoon. They stated they would be sending a new order form for pt to get casey supplies. I still have not received form yet. Will be on the lookout for it and will send back as soon as I receive it.

## 2022-11-09 ENCOUNTER — TELEPHONE (OUTPATIENT)
Dept: ENDOCRINOLOGY | Facility: CLINIC | Age: 57
End: 2022-11-09
Payer: MEDICAID

## 2022-11-09 DIAGNOSIS — E11.8 TYPE 2 DIABETES MELLITUS WITH COMPLICATION: Primary | ICD-10-CM

## 2022-11-09 RX ORDER — FLASH GLUCOSE SENSOR
1 KIT MISCELLANEOUS
Qty: 2 KIT | Refills: 11 | Status: SHIPPED | OUTPATIENT
Start: 2022-11-09

## 2022-11-09 RX ORDER — FLASH GLUCOSE SCANNING READER
1 EACH MISCELLANEOUS 4 TIMES DAILY
Qty: 1 EACH | Refills: 0 | Status: SHIPPED | OUTPATIENT
Start: 2022-11-09 | End: 2022-11-11 | Stop reason: SDUPTHER

## 2022-11-09 NOTE — TELEPHONE ENCOUNTER
----- Message from Zachary Black MA sent at 11/9/2022  4:37 PM CST -----  Contact: [facility]  Is she Dr. Taylor's pt?   ----- Message -----  From: Ayaan Gan MD  Sent: 11/9/2022   4:33 PM CST  To: Zachary Black MA    No, it looks like I saw her a year ago.  Maybe I have her a sample at that time?  She is seeing sheba next month so probably better for her to wait until then to discuss starting a CGM.      ----- Message -----  From: Zachary Black MA  Sent: 11/9/2022   3:51 PM CST  To: Ayaan Gan MD    Did you speak with this pt?   ----- Message -----  From: Fransisca Mares  Sent: 11/9/2022   8:17 AM CST  To: Elvia Kuo Staff    Pt brought in card information that is use for the freestyle sensor, however [facility] does not have a prescription for the sensor. [facility] pt informed her the information was given to her by Dr. Gan. [facility] would like to speak to someone in the office to clarify.     Confirmed contact below:  Contact Name:Estefani Sutherland  Phone Number: [phone]      [facility] [address] [place] [address] [address] [facility]  [address] [facility] [address]  Phone: [phone] Fax: [phone]

## 2022-11-09 NOTE — TELEPHONE ENCOUNTER
I spoke with alayna on Monday and they said they were sending paperwork for pt to get her supplies. I still have not received anything from them. Pt would like to try getting from [facility]

## 2022-11-09 NOTE — TELEPHONE ENCOUNTER
----- Message from Fransisca Mares sent at 11/9/2022 12:08 PM CST -----  Contact: INGRID from [facility]  INGRID, a Navigator with [facility], is calling in reference to the pt needing a replacement reader. He states that he would need a prescription for the reader to be placed by provider.     Contact number: [phone] ext [phone]

## 2022-11-10 ENCOUNTER — TELEPHONE (OUTPATIENT)
Dept: ENDOCRINOLOGY | Facility: CLINIC | Age: 57
End: 2022-11-10
Payer: MEDICAID

## 2022-11-10 NOTE — TELEPHONE ENCOUNTER
----- Message from Anitha Arevalo sent at 11/10/2022  9:38 AM CST -----  [facility] is calling about the rx that was send in for the [employer]yle casey .    Pt was giving a rx card from the dr to help pay for the prescriptions but the pharmacy is says it's missing some info.[facility] would like a call back    Phone: [phone] Fax: [phone]    [facility] [address] [address]  Phone: [phone] Fax: [phone]

## 2022-11-10 NOTE — TELEPHONE ENCOUNTER
Spoke with pharmacy. Evidently pt came in with  some random numbers, a group number and ID number, and told the pharmacist to just run those numbers so she can get the supplies for free. Pt did not actually have any card and we did not supply her with one. Explained to pharmacy that because of the insurance pt has, she needs to be getting her supplies through supply company. I did speak with alayna a couple of days ago and they said they were going to be sending us the proper forms. This was explained to patient. I am still currently waiting on those forms. Told pharmacy if pt comes back, to let her know we are working on it but it takes time and that she can call supply company to speak with them about it as well.

## 2022-11-11 ENCOUNTER — TELEPHONE (OUTPATIENT)
Dept: ENDOCRINOLOGY | Facility: CLINIC | Age: 57
End: 2022-11-11
Payer: MEDICAID

## 2022-11-11 DIAGNOSIS — E11.8 TYPE 2 DIABETES MELLITUS WITH COMPLICATION: ICD-10-CM

## 2022-11-11 RX ORDER — FLASH GLUCOSE SCANNING READER
EACH MISCELLANEOUS
Qty: 1 EACH | Refills: 0 | Status: SHIPPED | OUTPATIENT
Start: 2022-11-11

## 2022-11-11 NOTE — TELEPHONE ENCOUNTER
Call attempted, no answer, was able to leave [facility] explaining that we still have not received any requests for this patient and that I had spoken with someone earlier this week that said they were going to fax us the request but nothing has been received. I will go ahead and send chart notes but I do not have a CMN to go with them. Provided correct fax number and name of provider in the message so they can send again.

## 2022-11-11 NOTE — TELEPHONE ENCOUNTER
----- Message from Shane Lane sent at 11/11/2022  4:15 PM CST -----  Contact: j  Refill request. Sent to [facility] pharmacy      flash glucose scanning reader (FREEAffibodyYLE THEO 2 READER) [facility]       Contact number- [phone]

## 2022-11-11 NOTE — TELEPHONE ENCOUNTER
----- Message from Kelby Moreno MA sent at 11/11/2022  9:07 AM CST -----  [facility] calling if the office has request the request and when can they expect the clinic notes? Please advise.      [phone] phone number   [phone] Fax number

## 2022-12-12 ENCOUNTER — LAB VISIT (OUTPATIENT)
Dept: LAB | Facility: HOSPITAL | Age: 57
End: 2022-12-12
Attending: INTERNAL MEDICINE
Payer: MEDICAID

## 2022-12-12 DIAGNOSIS — E11.8 TYPE 2 DIABETES MELLITUS WITH COMPLICATION: ICD-10-CM

## 2022-12-12 DIAGNOSIS — E78.2 MIXED HYPERLIPIDEMIA: ICD-10-CM

## 2022-12-12 LAB
ALBUMIN SERPL BCP-MCNC: 3.7 G/DL (ref 3.5–5.2)
ALP SERPL-CCNC: 100 U/L (ref 55–135)
ALT SERPL W/O P-5'-P-CCNC: 22 U/L (ref 10–44)
ANION GAP SERPL CALC-SCNC: 10 MMOL/L (ref 8–16)
AST SERPL-CCNC: 18 U/L (ref 10–40)
BILIRUB SERPL-MCNC: 0.3 MG/DL (ref 0.1–1)
BUN SERPL-MCNC: 29 MG/DL (ref 6–20)
CALCIUM SERPL-MCNC: 10.2 MG/DL (ref 8.7–10.5)
CHLORIDE SERPL-SCNC: 102 MMOL/L (ref 95–110)
CHOLEST SERPL-MCNC: 184 MG/DL (ref 120–199)
CHOLEST/HDLC SERPL: 3.3 {RATIO} (ref 2–5)
CO2 SERPL-SCNC: 26 MMOL/L (ref 23–29)
CREAT SERPL-MCNC: 1.6 MG/DL (ref 0.5–1.4)
EST. GFR  (NO RACE VARIABLE): 37.2 ML/MIN/1.73 M^2
ESTIMATED AVG GLUCOSE: 243 MG/DL (ref 68–131)
GLUCOSE SERPL-MCNC: 247 MG/DL (ref 70–110)
HBA1C MFR BLD: 10.1 % (ref 4–5.6)
HDLC SERPL-MCNC: 56 MG/DL (ref 40–75)
HDLC SERPL: 30.4 % (ref 20–50)
LDLC SERPL CALC-MCNC: 105 MG/DL (ref 63–159)
NONHDLC SERPL-MCNC: 128 MG/DL
POTASSIUM SERPL-SCNC: 4.1 MMOL/L (ref 3.5–5.1)
PROT SERPL-MCNC: 7.5 G/DL (ref 6–8.4)
SODIUM SERPL-SCNC: 138 MMOL/L (ref 136–145)
TRIGL SERPL-MCNC: 115 MG/DL (ref 30–150)

## 2022-12-12 PROCEDURE — 80061 LIPID PANEL: CPT | Performed by: INTERNAL MEDICINE

## 2022-12-12 PROCEDURE — 80053 COMPREHEN METABOLIC PANEL: CPT | Performed by: INTERNAL MEDICINE

## 2022-12-12 PROCEDURE — 36415 COLL VENOUS BLD VENIPUNCTURE: CPT | Performed by: INTERNAL MEDICINE

## 2022-12-12 PROCEDURE — 83036 HEMOGLOBIN GLYCOSYLATED A1C: CPT | Performed by: INTERNAL MEDICINE

## 2022-12-13 ENCOUNTER — TELEPHONE (OUTPATIENT)
Dept: ENDOCRINOLOGY | Facility: CLINIC | Age: 57
End: 2022-12-13
Payer: MEDICAID

## 2022-12-13 NOTE — TELEPHONE ENCOUNTER
----- Message from Katelyn Suarez MA sent at 12/12/2022  7:54 PM CST -----  Contact: pt/[phone]    ----- Message -----  From: Milagros Scales  Sent: 12/12/2022   5:12 PM CST  To: Brandon Renteria Staff    Type:  Patient Call    Who Called:PT    Would the patient rather a call back or a response via [facility]? [facility]    Best Call Back Number:[phone]  Additional Information: Pt  is looking to  reschedule

## 2022-12-13 NOTE — TELEPHONE ENCOUNTER
Spoke with patient, offered to reschedule her appt for the next available which is in march. Pt was not agreeable to this and asked if she could be scheduled with dr willard. Looked through dr alexander schedule but she does not have anything at all. Pt asked if she could be scheduled with any provider within the next week or so. Looked through every providers schedule but nothing available. Pt asked that her current appt just be canceled for now and stated she will call back when she is ready to schedule.

## 2023-03-07 ENCOUNTER — OFFICE VISIT (OUTPATIENT)
Dept: ORTHOPEDICS | Facility: CLINIC | Age: 58
End: 2023-03-07
Payer: MEDICARE

## 2023-03-07 VITALS — WEIGHT: 168.56 LBS | BODY MASS INDEX: 29.87 KG/M2 | HEIGHT: 63 IN

## 2023-03-07 DIAGNOSIS — E11.42 DIABETIC POLYNEUROPATHY ASSOCIATED WITH TYPE 2 DIABETES MELLITUS: Primary | Chronic | ICD-10-CM

## 2023-03-07 PROCEDURE — 99499 NO LOS: ICD-10-PCS | Mod: S$GLB,,, | Performed by: PHYSICIAN ASSISTANT

## 2023-03-07 PROCEDURE — 3008F BODY MASS INDEX DOCD: CPT | Mod: CPTII,S$GLB,, | Performed by: PHYSICIAN ASSISTANT

## 2023-03-07 PROCEDURE — 99999 PR PBB SHADOW E&M-EST. PATIENT-LVL III: CPT | Mod: PBBFAC,,, | Performed by: PHYSICIAN ASSISTANT

## 2023-03-07 PROCEDURE — 99999 PR PBB SHADOW E&M-EST. PATIENT-LVL III: ICD-10-PCS | Mod: PBBFAC,,, | Performed by: PHYSICIAN ASSISTANT

## 2023-03-07 PROCEDURE — 1159F MED LIST DOCD IN RCRD: CPT | Mod: CPTII,S$GLB,, | Performed by: PHYSICIAN ASSISTANT

## 2023-03-07 PROCEDURE — 99499 UNLISTED E&M SERVICE: CPT | Mod: S$GLB,,, | Performed by: PHYSICIAN ASSISTANT

## 2023-03-07 PROCEDURE — 3008F PR BODY MASS INDEX (BMI) DOCUMENTED: ICD-10-PCS | Mod: CPTII,S$GLB,, | Performed by: PHYSICIAN ASSISTANT

## 2023-03-07 PROCEDURE — 99213 OFFICE O/P EST LOW 20 MIN: CPT | Mod: PBBFAC | Performed by: PHYSICIAN ASSISTANT

## 2023-03-07 PROCEDURE — 1159F PR MEDICATION LIST DOCUMENTED IN MEDICAL RECORD: ICD-10-PCS | Mod: CPTII,S$GLB,, | Performed by: PHYSICIAN ASSISTANT

## 2023-03-07 NOTE — PROGRESS NOTES
Patient is having recurrence of her polyneuropathy wishes to see Endocrinology will reschedule to appropriate department

## 2023-03-08 ENCOUNTER — TELEPHONE (OUTPATIENT)
Dept: ENDOCRINOLOGY | Facility: CLINIC | Age: 58
End: 2023-03-08
Payer: MEDICARE

## 2023-03-08 NOTE — TELEPHONE ENCOUNTER
Spoke with [facility] pharmacy, [facility] pharmacy cleared the confusion and let me know that it was to soon at the moment to refill patient insulin. [facility] pharmacy stated the patient can  rx today with no charge.

## 2023-03-08 NOTE — TELEPHONE ENCOUNTER
----- Message from Deann Dove MA sent at 3/6/2023  4:51 PM CST -----  Regarding: FW: clarification on meds  Contact: [phone]    ----- Message -----  From: Blanquita Henry  Sent: 3/3/2023   3:00 PM CST  To: Manfred Foster Staff  Subject: clarification on meds                            Walter calling regarding clarification of direction for insulin lispro 100 unit/mL injection    [facility] [address] [place] [address]  Phone: [phone] Fax: [phone]

## 2023-04-03 NOTE — PROGRESS NOTES
"Endocrinology Return Visit  04/04/2023      Subjective:      Patient ID: Estefani Sutherland is a 57 y.o. female.    Chief Complaint:  DM2, HTN, HLD; Follow up     History of Present Illness    Regarding diabetes:    Initially Diagnosed with T2D:  Around age 29  At a past visit changed from Vgo 30 to Vgo 20 because she was scared of lows and was often taking of Vgo to avoid lows.     Last visit was with Dr Taylor on 11/2/2022. At that time Trulicity was increased to 1.5 mg weekly. Discussed starting SGLT2i but GFR fluctuates and Dr Taylor requested updated labs before starting which the pt did not want to do that day. After updated labs plan was to refer to nephrology. For her Vgo20, prandial insulin was increased from 3 to 4 clicks with meals, continued 2 clicks with snacks.  She reports that she is doing clicks for all meals - however answers are inconsistent when asked several times. Additionally, she is eating to "correct lows" any time her BG is under or close to 200.       Known diabetic complications:  +Retinopathy - blind in right eye and very poor vision in Left eye;   + nephropathy, last Cr 2.0 (GFR 32);  +Peripheral neuropathy without ulceration     Cardiovascular risk factors: obesity (BMI >= 30 kg/m2) , HLD, stroke 2015, HTN, CKD3     Current diabetic medications include:   VGO 20 and 4 or 5 clicks with meals (if BG>360 she does 5 clicks, otherwise dose 4 clicks), 2 with snacks - lispro  Trulicity 1.5 mg SC weekly on Mondays - has indigestion, on Pepcid (does not help) but trenton holt does   Jardiance 10mg qd     Other medications tried:  MFN - diarrhea  Glipizide       Diet: on average, 3 meals per day     BF: oatmeal     L: turkey, occ. veggies     D: largest  Meat, burger no bread, sandwich, soup     Snacks: potato chips     Exercise: none     Glucose Trends: using Enohm 2 with reader - tries to scan at least 3x every day;  Reviewed Moris report, uploaded to epic under media tab. global " "hyperglycemia   Never Rarely <200. She states she feels weak if it is below 200 - very afraid to go low, does not think her BG is high overall despite CGM data       Any episodes of hypoglycemia? denies     Family Hx:  Father - T2D later onset             Wt Readings from Last 10 Encounters:   03/07/23 76.5 kg (168 lb 8.7 oz)   11/02/22 75 kg (165 lb 5.5 oz)   07/22/22 72.9 kg (160 lb 11.5 oz)   11/24/21 73.6 kg (162 lb 4.1 oz)   05/18/21 73 kg (160 lb 15 oz)   11/22/19 71.6 kg (157 lb 15.4 oz)   08/19/19 69 kg (152 lb 3.6 oz)   11/01/17 64.8 kg (142 lb 14.4 oz)   08/02/17 64 kg (141 lb)   08/01/17 64 kg (141 lb 1.5 oz)       Diabetes Management Status    Statin: Taking - crestor 40 mg daily- unclear how consistently she is taking this  ACE/ARB: Taking - losartan 50 mg daily      Screening or Prevention Patient's value   HgA1C Testing and Control   Lab Results   Component Value Date    HGBA1C 10.4 (H) 02/07/2023        Lipid profile : 02/07/2023   LDL control Lab Results   Component Value Date    LDLCALC 113 02/07/2023      Nephropathy screening Lab Results   Component Value Date    LABMICR 506.0 11/24/2021     Lab Results   Component Value Date    PROTEINUA 2+ (A) 01/22/2016      Blood pressure BP Readings from Last 1 Encounters:   04/04/23 110/80      Dilated retinal exam : 10/19/2015 -- she states that she sees "eye specialists' outside of [facility] - thinks she saw someone in November 2022 - blind but still sees silhouettes in L eye     She states that she had a diabetic foot exam "5wks ago" - looks like it was with ortho    She had a podiatry appt that she states was canceled.    Objective:     /80   Pulse 85   Ht 5' 3" (1.6 m)   Wt 75 kg (165 lb 7.3 oz)   SpO2 99%   BMI 29.31 kg/m²   BP Readings from Last 3 Encounters:   04/04/23 110/80   11/02/22 130/78   07/22/22 (!) 140/88       Wt Readings from Last 1 Encounters:   03/07/23 1525 76.5 kg (168 lb 8.7 oz)     Body mass index is 29.31 kg/m².      Lab " "Review:   Lab Results   Component Value Date    HGBA1C 10.4 (H) 02/07/2023     Lab Results   Component Value Date    CHOL 190 02/07/2023    HDL 48 02/07/2023    LDLCALC 113 02/07/2023    TRIG 144 02/07/2023    CHOLHDL 3.96 02/07/2023     Lab Results   Component Value Date     12/12/2022    K 4.1 12/12/2022     12/12/2022    CO2 26 12/12/2022     (H) 12/12/2022    BUN 29 (H) 12/12/2022    CREATININE 1.6 (H) 12/12/2022    CALCIUM 10.2 12/12/2022    PROT 7.5 12/12/2022    ALBUMIN 3.7 12/12/2022    BILITOT 0.3 12/12/2022    ALKPHOS 100 12/12/2022    AST 18 12/12/2022    ALT 22 12/12/2022    ANIONGAP 10 12/12/2022    ESTGFRAFRICA 44.0 (A) 07/22/2022    EGFRNONAA 38.1 (A) 07/22/2022    TSH 1.337 11/24/2021     No results found for: [id number]    Assessment and Plan     Type 2 diabetes mellitus with ophthalmic complication, with long-term current use of insulin  Very uncontrolled T2DM with recent a1c 10.4, which is about where it was before   Suspect missing many prandial insulin doses - as her answers are very inconsistent when asked several times how many clicks she is doing and how often.   She is also eating without insulin to "correct lows" anytime her BG is even close to 200 - we again discussed with her that this is too high and she should not be correcting for lows unless BG<100 (we reviewed that <70 is a low, but since she feels symptomatic <200, it is reasonable to keep her between 100-200. She should absolutely not be correcting a low if BG is 200.    Will continue current regimen with no changes -- concerned that if we increase doses and she starts actually using insulin as recommended that she will go low and she has an extreme fear of lows and is resistant to any increases in doses.    Continue Vgo 20 with 4 clicks before meals and 2 clicks for snacks  Continue Trulicity 1.5 mg -- due to indigestion will not increase this dose - however if indigestion improves would slowly up-titrate to " max dose if pt allows  Continue Jardiance    She is due for foot exam -- needs podiatry for nail care as well - blind and cannot do her own nail care. Referral to podiatry ordered today.  She states that she has an eye dr - although blind in one eye, she does still have some vision in other eye - should be following with ophthalmology - she states multiple times that she has an eye dr and will make an appt - so did not place referral order for this.  Also due for urine MAC - she has refused this multiple times in the past and is unwilling to do this today. Discussed reason for ordering it and importance of getting it done soon.      We have been unable to make any progress with improving Ms Sutherland's diabetes control despite multiple visits and many attempts by multiple providers. Pt does not provide truthful or consistent history, noncompliance, and unwillingness to do anything to lower her BG<200. For this reason, she can continue f/u with her PCP for T2DM but she is not benefiting from following with us and with PCP. She has a PCP appt this week on 4/6. I discussed with the pt and significant other today that she can follow with her PCP for DM going forward.      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 If indigestion improves in the future, can slowly up-titrate as tolerated       F/u with PCP - currently scheduled later this week on 4/6 with Dr Joe Sylvester at [facility].      Kristin Shearer MD  [facility], 6th Floor  [address] [place], [place], [address]    Office: [phone]  Fax: [phone]

## 2023-04-04 ENCOUNTER — OFFICE VISIT (OUTPATIENT)
Dept: ENDOCRINOLOGY | Facility: CLINIC | Age: 58
End: 2023-04-04
Payer: MEDICARE

## 2023-04-04 VITALS
OXYGEN SATURATION: 99 % | HEART RATE: 85 BPM | WEIGHT: 165.44 LBS | SYSTOLIC BLOOD PRESSURE: 110 MMHG | BODY MASS INDEX: 29.31 KG/M2 | DIASTOLIC BLOOD PRESSURE: 80 MMHG | HEIGHT: 63 IN

## 2023-04-04 DIAGNOSIS — E66.3 OVERWEIGHT (BMI 25.0-29.9): ICD-10-CM

## 2023-04-04 DIAGNOSIS — E11.39 BLINDNESS OF BOTH EYES DUE TO DIABETES MELLITUS: ICD-10-CM

## 2023-04-04 DIAGNOSIS — Z79.4 TYPE 2 DIABETES MELLITUS WITH PROLIFERATIVE RETINOPATHY OF BOTH EYES, WITH LONG-TERM CURRENT USE OF INSULIN, MACULAR EDEMA PRESENCE UNSPECIFIED, UNSPECIFIED PROLIFERATIVE RETINOPATHY TYPE: ICD-10-CM

## 2023-04-04 DIAGNOSIS — E11.8 TYPE 2 DIABETES MELLITUS WITH COMPLICATION: Primary | ICD-10-CM

## 2023-04-04 DIAGNOSIS — E11.3593 TYPE 2 DIABETES MELLITUS WITH PROLIFERATIVE RETINOPATHY OF BOTH EYES, WITH LONG-TERM CURRENT USE OF INSULIN, MACULAR EDEMA PRESENCE UNSPECIFIED, UNSPECIFIED PROLIFERATIVE RETINOPATHY TYPE: ICD-10-CM

## 2023-04-04 DIAGNOSIS — H54.3 BLINDNESS OF BOTH EYES DUE TO DIABETES MELLITUS: ICD-10-CM

## 2023-04-04 PROCEDURE — 99214 PR OFFICE/OUTPT VISIT, EST, LEVL IV, 30-39 MIN: ICD-10-PCS | Mod: 25,GC,S$GLB, | Performed by: STUDENT IN AN ORGANIZED HEALTH CARE EDUCATION/TRAINING PROGRAM

## 2023-04-04 PROCEDURE — 1160F RVW MEDS BY RX/DR IN RCRD: CPT | Mod: CPTII,GC,S$GLB, | Performed by: STUDENT IN AN ORGANIZED HEALTH CARE EDUCATION/TRAINING PROGRAM

## 2023-04-04 PROCEDURE — 1160F PR REVIEW ALL MEDS BY PRESCRIBER/CLIN PHARMACIST DOCUMENTED: ICD-10-PCS | Mod: CPTII,GC,S$GLB, | Performed by: STUDENT IN AN ORGANIZED HEALTH CARE EDUCATION/TRAINING PROGRAM

## 2023-04-04 PROCEDURE — 99214 OFFICE O/P EST MOD 30 MIN: CPT | Mod: 25,GC,S$GLB, | Performed by: STUDENT IN AN ORGANIZED HEALTH CARE EDUCATION/TRAINING PROGRAM

## 2023-04-04 PROCEDURE — 95251 CONT GLUC MNTR ANALYSIS I&R: CPT | Mod: GC,S$GLB,, | Performed by: STUDENT IN AN ORGANIZED HEALTH CARE EDUCATION/TRAINING PROGRAM

## 2023-04-04 PROCEDURE — 3008F PR BODY MASS INDEX (BMI) DOCUMENTED: ICD-10-PCS | Mod: CPTII,GC,S$GLB, | Performed by: STUDENT IN AN ORGANIZED HEALTH CARE EDUCATION/TRAINING PROGRAM

## 2023-04-04 PROCEDURE — 99999 PR PBB SHADOW E&M-EST. PATIENT-LVL V: ICD-10-PCS | Mod: PBBFAC,GC,, | Performed by: STUDENT IN AN ORGANIZED HEALTH CARE EDUCATION/TRAINING PROGRAM

## 2023-04-04 PROCEDURE — 3074F PR MOST RECENT SYSTOLIC BLOOD PRESSURE < 130 MM HG: ICD-10-PCS | Mod: CPTII,GC,S$GLB, | Performed by: STUDENT IN AN ORGANIZED HEALTH CARE EDUCATION/TRAINING PROGRAM

## 2023-04-04 PROCEDURE — 3079F DIAST BP 80-89 MM HG: CPT | Mod: CPTII,GC,S$GLB, | Performed by: STUDENT IN AN ORGANIZED HEALTH CARE EDUCATION/TRAINING PROGRAM

## 2023-04-04 PROCEDURE — 3079F PR MOST RECENT DIASTOLIC BLOOD PRESSURE 80-89 MM HG: ICD-10-PCS | Mod: CPTII,GC,S$GLB, | Performed by: STUDENT IN AN ORGANIZED HEALTH CARE EDUCATION/TRAINING PROGRAM

## 2023-04-04 PROCEDURE — 3008F BODY MASS INDEX DOCD: CPT | Mod: CPTII,GC,S$GLB, | Performed by: STUDENT IN AN ORGANIZED HEALTH CARE EDUCATION/TRAINING PROGRAM

## 2023-04-04 PROCEDURE — 1159F PR MEDICATION LIST DOCUMENTED IN MEDICAL RECORD: ICD-10-PCS | Mod: CPTII,GC,S$GLB, | Performed by: STUDENT IN AN ORGANIZED HEALTH CARE EDUCATION/TRAINING PROGRAM

## 2023-04-04 PROCEDURE — 3074F SYST BP LT 130 MM HG: CPT | Mod: CPTII,GC,S$GLB, | Performed by: STUDENT IN AN ORGANIZED HEALTH CARE EDUCATION/TRAINING PROGRAM

## 2023-04-04 PROCEDURE — 1159F MED LIST DOCD IN RCRD: CPT | Mod: CPTII,GC,S$GLB, | Performed by: STUDENT IN AN ORGANIZED HEALTH CARE EDUCATION/TRAINING PROGRAM

## 2023-04-04 PROCEDURE — 95251 PR GLUCOSE MONITOR, 72 HOUR, PHYS INTERP: ICD-10-PCS | Mod: GC,S$GLB,, | Performed by: STUDENT IN AN ORGANIZED HEALTH CARE EDUCATION/TRAINING PROGRAM

## 2023-04-04 PROCEDURE — 99999 PR PBB SHADOW E&M-EST. PATIENT-LVL V: CPT | Mod: PBBFAC,GC,, | Performed by: STUDENT IN AN ORGANIZED HEALTH CARE EDUCATION/TRAINING PROGRAM

## 2023-04-04 NOTE — PATIENT INSTRUCTIONS
Your blood sugar is too high all the time. Your high sugars are damaging all of your organs.     Low blood sugar is less than 70. You should be doing your clicks with meals and snacks always, even if sugar is less than 200. You do not need to eat a snack for sugar less than 200. 200 is way too high.     Continue 4 clicks with meals and 2 clicks with snacks.   Continue the Trulicity and Jardiance    I am ordering a referral for podiatry - someone from their office will call you to schedule this appointment.     You are due for a urine test to look for protein in the urine.     You can follow up with your primary care doctor for your diabetes. You are scheduled to see him later this week on 4/6.

## 2023-04-04 NOTE — ASSESSMENT & PLAN NOTE
BMI 29.31 today  She is on Trulicity 1.5 mg weekly -- having indigestion so not increasing dose at this time. If indigestion improves in the future, can slowly up-titrate as tolerated

## 2023-04-04 NOTE — ASSESSMENT & PLAN NOTE
"Very uncontrolled T2DM with recent a1c 10.4, which is about where it was before   Suspect missing many prandial insulin doses - as her answers are very inconsistent when asked several times how many clicks she is doing and how often.   She is also eating without insulin to "correct lows" anytime her BG is even close to 200 - we again discussed with her that this is too high and she should not be correcting for lows unless BG<100 (we reviewed that <70 is a low, but since she feels symptomatic <200, it is reasonable to keep her between 100-200. She should absolutely not be correcting a low if BG is 200.    Will continue current regimen with no changes -- concerned that if we increase doses and she starts actually using insulin as recommended that she will go low and she has an extreme fear of lows and is resistant to any increases in doses.    Continue Vgo 20 with 4 clicks before meals and 2 clicks for snacks  Continue Trulicity 1.5 mg -- due to indigestion will not increase this dose - however if indigestion improves would slowly up-titrate to max dose if pt allows  Continue Jardiance    She is due for foot exam -- needs podiatry for nail care as well - blind and cannot do her own nail care. Referral to podiatry ordered today.  She states that she has an eye dr - although blind in one eye, she does still have some vision in other eye - should be following with ophthalmology - she states multiple times that she has an eye dr and will make an appt - so did not place referral order for this.  Also due for urine MAC - she has refused this multiple times in the past and is unwilling to do this today. Discussed reason for ordering it and importance of getting it done soon.      We have been unable to make any progress with improving Ms Sutherland's diabetes control despite multiple visits and many attempts by multiple providers. Pt does not provide truthful or consistent history, noncompliance, and unwillingness to do " anything to lower her BG<200. For this reason, she can continue f/u with her PCP for T2DM but she is not benefiting from following with us and with PCP. She has a PCP appt this week on 4/6. I discussed with the pt and significant other today that she can follow with her PCP for DM going forward.

## 2023-04-04 NOTE — ASSESSMENT & PLAN NOTE
Discussed with pt and significant other implications of uncontrolled DM and risk of loss of any remaining vision she has in one eye

## 2023-04-04 NOTE — PROGRESS NOTES
I have seen the patient, reviewed the Fellow's history and physical, assessment, plan, and progress note. I have personally interviewed and examined the patient at bedside and agree with the findings.     Global hyperglycemia due to patient under dosing or omitting insulin boluses when her sugar is below 200. I encouraged her to use the recommended doses of insulin that are prescribed. We discussed the consequences of uncontrolled hyperglycemia.    Madi Sharif MD  Endocrinology Staff

## 2023-05-30 DIAGNOSIS — E11.8 TYPE 2 DIABETES MELLITUS WITH COMPLICATION: ICD-10-CM

## 2023-05-30 RX ORDER — INSULIN LISPRO 100 [IU]/ML
INJECTION, SOLUTION INTRAVENOUS; SUBCUTANEOUS
Qty: 90 ML | Refills: 3 | Status: SHIPPED | OUTPATIENT
Start: 2023-05-30 | End: 2023-06-14 | Stop reason: SDUPTHER

## 2023-06-14 DIAGNOSIS — E11.8 TYPE 2 DIABETES MELLITUS WITH COMPLICATION: ICD-10-CM

## 2023-06-14 RX ORDER — INSULIN LISPRO 100 [IU]/ML
INJECTION, SOLUTION INTRAVENOUS; SUBCUTANEOUS
Qty: 90 ML | Refills: 3 | Status: SHIPPED | OUTPATIENT
Start: 2023-06-14 | End: 2023-08-01 | Stop reason: SDUPTHER

## 2023-06-14 RX ORDER — SUB-Q INSULIN DEVICE, 40 UNIT
1 EACH MISCELLANEOUS DAILY
Qty: 30 EACH | Refills: 5 | Status: SHIPPED | OUTPATIENT
Start: 2023-06-14

## 2023-07-12 ENCOUNTER — TELEPHONE (OUTPATIENT)
Dept: PODIATRY | Facility: CLINIC | Age: 58
End: 2023-07-12
Payer: MEDICARE

## 2023-07-12 NOTE — TELEPHONE ENCOUNTER
Left message for patient to give a callback.        ----- Message from Sheila Marcus sent at 7/12/2023 10:21 AM CDT -----  .Type: Patient Call Back    Who called:self     What is the request in detail:patient is inquiring for a magazine to order diabetic shoes , please advise    Can the clinic reply by [facility]? call    Would the patient rather a call back or a response via [facility]? call    Best call back number:.[phone]     Additional Information:

## 2023-07-14 ENCOUNTER — OFFICE VISIT (OUTPATIENT)
Dept: PODIATRY | Facility: CLINIC | Age: 58
End: 2023-07-14
Payer: MEDICARE

## 2023-07-14 VITALS — BODY MASS INDEX: 29.3 KG/M2 | WEIGHT: 165.38 LBS | HEIGHT: 63 IN

## 2023-07-14 DIAGNOSIS — M20.42 HAMMER TOES OF BOTH FEET: ICD-10-CM

## 2023-07-14 DIAGNOSIS — L74.4 ANHYDROSIS: ICD-10-CM

## 2023-07-14 DIAGNOSIS — B35.1 ONYCHOMYCOSIS DUE TO DERMATOPHYTE: ICD-10-CM

## 2023-07-14 DIAGNOSIS — M20.41 HAMMER TOES OF BOTH FEET: ICD-10-CM

## 2023-07-14 DIAGNOSIS — E11.8 TYPE 2 DIABETES MELLITUS WITH COMPLICATION: ICD-10-CM

## 2023-07-14 DIAGNOSIS — E11.42 TYPE 2 DIABETES MELLITUS WITH DIABETIC POLYNEUROPATHY, UNSPECIFIED WHETHER LONG TERM INSULIN USE: Primary | ICD-10-CM

## 2023-07-14 PROCEDURE — 3008F BODY MASS INDEX DOCD: CPT | Mod: CPTII,S$GLB,, | Performed by: PODIATRIST

## 2023-07-14 PROCEDURE — 1159F MED LIST DOCD IN RCRD: CPT | Mod: CPTII,S$GLB,, | Performed by: PODIATRIST

## 2023-07-14 PROCEDURE — 99204 PR OFFICE/OUTPT VISIT, NEW, LEVL IV, 45-59 MIN: ICD-10-PCS | Mod: 25,S$GLB,, | Performed by: PODIATRIST

## 2023-07-14 PROCEDURE — 99999 PR PBB SHADOW E&M-EST. PATIENT-LVL IV: CPT | Mod: PBBFAC,,, | Performed by: PODIATRIST

## 2023-07-14 PROCEDURE — 11721 PR DEBRIDEMENT OF NAILS, 6 OR MORE: ICD-10-PCS | Mod: Q9,S$GLB,, | Performed by: PODIATRIST

## 2023-07-14 PROCEDURE — 3008F PR BODY MASS INDEX (BMI) DOCUMENTED: ICD-10-PCS | Mod: CPTII,S$GLB,, | Performed by: PODIATRIST

## 2023-07-14 PROCEDURE — 1159F PR MEDICATION LIST DOCUMENTED IN MEDICAL RECORD: ICD-10-PCS | Mod: CPTII,S$GLB,, | Performed by: PODIATRIST

## 2023-07-14 PROCEDURE — 99204 OFFICE O/P NEW MOD 45 MIN: CPT | Mod: 25,S$GLB,, | Performed by: PODIATRIST

## 2023-07-14 PROCEDURE — 11721 DEBRIDE NAIL 6 OR MORE: CPT | Mod: Q9,S$GLB,, | Performed by: PODIATRIST

## 2023-07-14 PROCEDURE — 99999 PR PBB SHADOW E&M-EST. PATIENT-LVL IV: ICD-10-PCS | Mod: PBBFAC,,, | Performed by: PODIATRIST

## 2023-07-14 RX ORDER — ATORVASTATIN CALCIUM 40 MG/1
40 TABLET, FILM COATED ORAL
COMMUNITY
Start: 2023-06-12

## 2023-07-14 RX ORDER — AMMONIUM LACTATE 12 G/100G
CREAM TOPICAL
Qty: 140 G | Refills: 11 | Status: SHIPPED | OUTPATIENT
Start: 2023-07-14

## 2023-07-14 RX ORDER — METOPROLOL SUCCINATE 50 MG/1
50 TABLET, EXTENDED RELEASE ORAL
COMMUNITY
Start: 2023-06-12

## 2023-07-14 RX ORDER — CICLOPIROX 80 MG/ML
SOLUTION TOPICAL NIGHTLY
Qty: 6.6 ML | Refills: 11 | Status: SHIPPED | OUTPATIENT
Start: 2023-07-14

## 2023-07-14 RX ORDER — GABAPENTIN 400 MG/1
400 CAPSULE ORAL 2 TIMES DAILY
COMMUNITY
Start: 2023-04-08

## 2023-07-14 NOTE — PROGRESS NOTES
Subjective:      Patient ID: Estefani Sutherland is a 58 y.o. female.    Chief Complaint: Diabetic Foot Exam (Eliezer Shearer MD 04/04/2023)    Diabetes, increased risk amputation needing evaluation/management/optomization of foot care.    Cc2 dry skin that burns both feet.  Gradual onset, worsening over past several weeks, aggravated by increased weight bearing, shoe gear, pressure.  No previous medical treatment.  OTC pain med not helping.     Cc3 thick discolored long painful toenails all toes.  Gradual onset, worsening over past several weeks, aggravated by increased weight bearing, shoe gear, pressure.  No previous medical treatment.  OTC pain med not helping. Denies trauma, sugery    Chief Complaint   Patient presents with    Diabetic Foot Exam     Eliezer Shearer MD 04/04/2023       Casual shoes    Review of Systems   Constitutional: Negative for chills, diaphoresis, fever, malaise/fatigue and night sweats.   Cardiovascular:  Negative for claudication, cyanosis, leg swelling and syncope.   Skin:  Positive for dry skin and nail changes. Negative for color change, rash, suspicious lesions and unusual hair distribution.   Musculoskeletal:  Negative for falls, joint pain, joint swelling, muscle cramps, muscle weakness and stiffness.   Gastrointestinal:  Negative for constipation, diarrhea, nausea and vomiting.   Neurological:  Positive for paresthesias and sensory change. Negative for brief paralysis, disturbances in coordination, focal weakness, numbness and tremors.         Objective:      Physical Exam  Constitutional:       General: She is not in acute distress.     Appearance: She is well-developed. She is not diaphoretic.   Cardiovascular:      Pulses:           Popliteal pulses are 2+ on the right side and 2+ on the left side.        Dorsalis pedis pulses are 2+ on the right side and 2+ on the left side.        Posterior tibial pulses are 2+ on the right side and 2+ on the left side.      Comments:  Capillary refill 3 seconds all toes/distal feet, all toes/both feet warm to touch.      Negative lymphadenopathy bilateral popliteal fossa and tarsal tunnel.      Negavie lower extremity edema bilateral.    Musculoskeletal:      Right ankle: No swelling, deformity, ecchymosis or lacerations. Normal range of motion. Normal pulse.      Right Achilles Tendon: Normal. No defects. Hdz's test negative.      Comments: Patient has hammertoes of digits    2-5 bilateral               reducible without symptom today.    Otherwise, Normal angle, base, station of gait. All ten toes without clubbing, cyanosis, or signs of ischemia.  No pain to palpation bilateral lower extremities.  Range of motion, stability, muscle strength, and muscle tone normal bilateral feet and legs.    Lymphadenopathy:      Lower Body: No right inguinal adenopathy. No left inguinal adenopathy.      Comments: Negative lymphadenopathy bilateral popliteal fossa and tarsal tunnel.    Negative lymphangitic streaking bilateral feet/ankles/legs.   Skin:     General: Skin is warm and dry.      Capillary Refill: Capillary refill takes 2 to 3 seconds.      Coloration: Skin is not pale.      Findings: No abrasion, bruising, burn, ecchymosis, erythema, laceration, lesion or rash.      Nails: There is no clubbing.      Comments: Very dry skin both feet with superficial fine flaking,  without ulceration, drainage, pus, tracking, fluctuance, malodor, or cardinal signs infection.      Toenails 1st, 2nd, 3rd, 4th, 5th  bilateral are hypertrophic thickened 2-3 mm, dystrophic, discolored tanish brown with tan, gray crumbly subungual debris.  Tender to distal nail plate pressure, without periungual skin abnormality of each.      Neurological:      Mental Status: She is alert and oriented to person, place, and time.      Sensory: No sensory deficit.      Motor: No tremor, atrophy or abnormal muscle tone.      Gait: Gait normal.      Comments: Negative tinel sign to  percussion sural, superficial peroneal, deep peroneal, saphenous, and posterior tibial nerves right and left ankles and feet.    Paresthesias, and burning bilateral feet with no clearly identified trigger or source.     Psychiatric:         Behavior: Behavior is cooperative.           Assessment:       Encounter Diagnoses   Name Primary?    Type 2 diabetes mellitus with complication     Type 2 diabetes mellitus with diabetic polyneuropathy, unspecified whether long term insulin use Yes    Onychomycosis due to dermatophyte     Hammer toes of both feet          Plan:       Estefani was seen today for diabetic foot exam.    Diagnoses and all orders for this visit:    Type 2 diabetes mellitus with diabetic polyneuropathy, unspecified whether long term insulin use  -     DIABETIC SHOES FOR HOME USE    Type 2 diabetes mellitus with complication  -     Ambulatory referral/consult to Podiatry  -     DIABETIC SHOES FOR HOME USE    Onychomycosis due to dermatophyte  -     DIABETIC SHOES FOR HOME USE    Hammer toes of both feet  -     DIABETIC SHOES FOR HOME USE    Other orders  -     ciclopirox (PENLAC) 8 % Soln; Apply topically nightly.  -     ammonium lactate 12 % Crea; Apply twice daily to affected parts both feet as needed.      I counseled the patient on her conditions, their implications and medical management.        The patient has received literature on basic diabetic foot care.  Patient will inspect feet daily, wear protective shoe gear when ambulatory, and apply moisturizer to skin as needed to maintain elasticity and help prevent ulceration.    With the patient's permission, I debrided all ten toenails with a sterile nipper and curette, removing all offending nail and debris.  Patient tolerated the procedure well and related significant relief.    Discussed conservative treatment with shoes of adequate dimensions, material, and style to alleviate symptoms and delay or prevent surgical intervention.    Penlac, lac  [employer] bradley shoes'inserts          Follow up in about 1 year (around 7/14/2024).

## 2023-08-01 DIAGNOSIS — E11.8 TYPE 2 DIABETES MELLITUS WITH COMPLICATION: ICD-10-CM

## 2023-08-01 RX ORDER — DULAGLUTIDE 1.5 MG/.5ML
1.5 INJECTION, SOLUTION SUBCUTANEOUS WEEKLY
Qty: 4 PEN | Refills: 11 | Status: SHIPPED | OUTPATIENT
Start: 2023-08-01

## 2023-08-01 RX ORDER — INSULIN LISPRO 100 [IU]/ML
INJECTION, SOLUTION INTRAVENOUS; SUBCUTANEOUS
Qty: 90 ML | Refills: 3 | Status: SHIPPED | OUTPATIENT
Start: 2023-08-01 | End: 2024-02-03 | Stop reason: SDUPTHER

## 2023-08-01 NOTE — TELEPHONE ENCOUNTER
----- Message from Fabiana Delgadillo sent at 8/1/2023  1:32 PM CDT -----  Regarding: refills  Contact: [phone]  Pt called in regarding refills she needs on meds. blood sugar diagnostic Strp dulaglutide (TRULICITY) 1.5 mg/0.5 mL pen injector and Humalog. Pt states she is out of meds and has to take them Wednesday. Please call to discuss further.          [id number] - [place], [place] [address] [place] [address]  [place] [address]  Phone: [phone] Fax: [phone]

## 2023-12-26 ENCOUNTER — TELEPHONE (OUTPATIENT)
Dept: PODIATRY | Facility: CLINIC | Age: 58
End: 2023-12-26
Payer: MEDICARE

## 2023-12-26 NOTE — TELEPHONE ENCOUNTER
Left message for patient to give a callback.        ----- Message from Abi Naqvi sent at 12/26/2023  9:21 AM CST -----  Regarding: self  Who called: self        What is the request in detail: calling to get prescription for diabetic shoes/ pt would like call back and appt        Can the clinic reply by [facility]? No        Would the patient rather a call back or a response via [facility]?  Call back       Best call back number:[phone]

## 2024-01-02 ENCOUNTER — TELEPHONE (OUTPATIENT)
Dept: PODIATRY | Facility: CLINIC | Age: 59
End: 2024-01-02
Payer: MEDICARE

## 2024-01-02 NOTE — TELEPHONE ENCOUNTER
Staff informed patient to call health insurance to ask what podiatrist is in network.      Patient verbalized understanding.      ----- Message from Sharmin Ozuna MA sent at 12/29/2023  8:31 AM CST -----  Regarding: Return Call  Message Type: Return Call           Who Called:ELYSE JOHNSON [[id number]]              Who Left Message for Patient:Medina Dennison MA                Does the patient know what this is regarding?  no              Best Call Back Number:[phone]               Additional Information: Patient is returning a call.  Please assist.

## 2024-01-02 NOTE — TELEPHONE ENCOUNTER
----- Message from Noah Vu sent at 1/2/2024  8:19 AM CST -----  Regarding: Return Call    Who Called:  Patient      Who Left Message for Patient:  Medina      Does the patient know what this is regarding?  Patient stated she is returning a call from 12/26/2023        Best Call Back Number: [phone]      Additional Information: Patient is returning a call.  Please assist.

## 2024-02-02 DIAGNOSIS — E11.8 TYPE 2 DIABETES MELLITUS WITH COMPLICATION: ICD-10-CM

## 2024-02-03 RX ORDER — INSULIN LISPRO 100 [IU]/ML
INJECTION, SOLUTION INTRAVENOUS; SUBCUTANEOUS
Qty: 90 ML | Refills: 0 | Status: SHIPPED | OUTPATIENT
Start: 2024-02-03

## 2024-04-03 ENCOUNTER — TELEPHONE (OUTPATIENT)
Dept: PODIATRY | Facility: CLINIC | Age: 59
End: 2024-04-03
Payer: MEDICARE

## 2024-04-03 NOTE — TELEPHONE ENCOUNTER
Staff informed patient she will need to schedule an appointment with provider to get a new script for diabetic shoes.    Appointment scheduled for 4/23 at 9:15a.     Patient verbalized understanding.        ----- Message from Amira Walters sent at 4/3/2024  9:51 AM CDT -----  .Type: Patient Call Back    Who called: Self     What is the request in detail: Requesting a prescription for diabetic shoes sent to [facility]    Can the clinic reply by [facility]? No     Would the patient rather a call back or a response via [facility]? Call Back     Best call back number: .[phone] (home)       Additional Information:

## 2024-04-21 ENCOUNTER — OFFICE VISIT (OUTPATIENT)
Dept: URGENT CARE | Facility: CLINIC | Age: 59
End: 2024-04-21
Payer: MEDICARE

## 2024-04-21 VITALS
RESPIRATION RATE: 16 BRPM | OXYGEN SATURATION: 96 % | DIASTOLIC BLOOD PRESSURE: 69 MMHG | TEMPERATURE: 98 F | BODY MASS INDEX: 29.23 KG/M2 | WEIGHT: 165 LBS | HEIGHT: 63 IN | HEART RATE: 75 BPM | SYSTOLIC BLOOD PRESSURE: 104 MMHG

## 2024-04-21 DIAGNOSIS — J06.9 UPPER RESPIRATORY INFECTION WITH COUGH AND CONGESTION: Primary | ICD-10-CM

## 2024-04-21 DIAGNOSIS — R09.81 COUGH WITH CONGESTION OF PARANASAL SINUS: ICD-10-CM

## 2024-04-21 DIAGNOSIS — R09.82 ALLERGIC RHINITIS WITH POSTNASAL DRIP: ICD-10-CM

## 2024-04-21 DIAGNOSIS — R05.8 COUGH WITH CONGESTION OF PARANASAL SINUS: ICD-10-CM

## 2024-04-21 DIAGNOSIS — N18.32 STAGE 3B CHRONIC KIDNEY DISEASE: ICD-10-CM

## 2024-04-21 DIAGNOSIS — Z11.59 ENCOUNTER FOR SCREENING FOR OTHER VIRAL DISEASES: ICD-10-CM

## 2024-04-21 DIAGNOSIS — J30.9 ALLERGIC RHINITIS WITH POSTNASAL DRIP: ICD-10-CM

## 2024-04-21 LAB
CTP QC/QA: YES
SARS-COV-2 AG RESP QL IA.RAPID: NEGATIVE

## 2024-04-21 PROCEDURE — 99204 OFFICE O/P NEW MOD 45 MIN: CPT | Mod: S$GLB,,, | Performed by: PHYSICIAN ASSISTANT

## 2024-04-21 PROCEDURE — 87811 SARS-COV-2 COVID19 W/OPTIC: CPT | Mod: QW,S$GLB,, | Performed by: PHYSICIAN ASSISTANT

## 2024-04-21 RX ORDER — AZELASTINE 1 MG/ML
1 SPRAY, METERED NASAL 2 TIMES DAILY PRN
Qty: 30 ML | Refills: 0 | Status: SHIPPED | OUTPATIENT
Start: 2024-04-21

## 2024-04-21 RX ORDER — CETIRIZINE HYDROCHLORIDE 10 MG/1
10 TABLET ORAL DAILY PRN
Qty: 30 TABLET | Refills: 0 | Status: SHIPPED | OUTPATIENT
Start: 2024-04-21 | End: 2025-04-21

## 2024-04-21 RX ORDER — AZITHROMYCIN 250 MG/1
TABLET, FILM COATED ORAL
Qty: 6 TABLET | Refills: 0 | Status: SHIPPED | OUTPATIENT
Start: 2024-04-21 | End: 2024-04-26

## 2024-04-21 RX ORDER — DEXTROMETHORPHAN HYDROBROMIDE AND GUAIFENESIN 10; 200 MG/1; MG/1
1 CAPSULE, GELATIN COATED ORAL
Qty: 30 EACH | Refills: 0 | Status: SHIPPED | OUTPATIENT
Start: 2024-04-21

## 2024-04-21 RX ORDER — BRIMONIDINE TARTRATE AND TIMOLOL MALEATE 2; 5 MG/ML; MG/ML
SOLUTION OPHTHALMIC
COMMUNITY

## 2024-04-21 RX ORDER — BENZONATATE 100 MG/1
200 CAPSULE ORAL 3 TIMES DAILY PRN
Qty: 30 CAPSULE | Refills: 0 | Status: SHIPPED | OUTPATIENT
Start: 2024-04-21

## 2024-04-21 NOTE — PATIENT INSTRUCTIONS
PLEASE READ YOUR DISCHARGE INSTRUCTIONS ENTIRELY AS IT CONTAINS IMPORTANT INFORMATION.    Patient had covid testing done today.    Discussed corona virus precautions and reviewed [facility] FAC; printed a copy for patient.  I discussed to continue to monitor their symptoms. Discussed that if their symptoms persist or worsen to seek re-evaluation. Clinic vs. ER precautions were given.  Patient verbalized understanding and agreed with the entire plan of care.    If Negative and no direct exposure: symptom free without fever reducing meds in 24 hours - can go back to work in 24 hours.    - Reviewed radiographs and all diagnostic testing with patient/family.    - Rest.  Drink plenty of fluids.    - Tyleno as directed as needed for fever/pain.  For Tylenol, do not exceed 3000 mg/ day. If no contraindication or allergies.  -OK to supplement with OTC NyQuil at night as needed for cough and congestion.  Use caution of total amount of Tylenol/acetaminophen per day.  - take Tessalon as needed for cough suppression.   - continue albuterol inhaler as needed for shortness of breath/wheezing  - If you were prescribed antibiotics, please take them to completion. Please supplement with OTC probiotics and yogurt.  Contact clinic if develop profuse diarrhea and weakness.    -Below are suggestions for symptomatic relief:              -Salt water gargles to soothe throat pain.              -Chloroseptic spray also helps to numb throat pain. Drink hot tea with honey or lemon to soothe your throat.              -Nasal saline spray reduces inflammation and dryness.              -Warm face compresses to help with facial sinus pain/pressure.              -Vicks vapor rub at night.              -Astepro/Azelastine NASAL SPRAY twice day for nasal/sinus congestion   **may also supplement with OTC nasal spray to help with inflammation and congestion.   Wean to off when you nose becomes to dry or bleed. Also use nasal saline twice a day to help with  dryness.               -Flonase OTC or Nasacort OTC  once or twice a day for nasal/sinus congestion. DON'T USE IF YOU HAVE GLAUCOMA. CHECK WITH YOUR PHARMACIST/EYE PHYSICIAN.              -Simple foods like chicken noodle soup.              -Mucinex DM (ANY COUGH EXPECTORANT-- guaifenesin) for cough or chest congestion with mucus and (ANY COUGH SUPPRESSANT- dextromethorphan) helps with coughing every 12 hours. Mucinex-DM if you have chest congestion or sputum (caution if history of high blood pressure or palpitations).              -Zyrtec/Claritin/xyzal during the day time  & Benadryl at night (only if severe runny nose) may help with allergies and runny nose. Add decongestant if you have nasal/sinus congestion/sinus pressure/ear fullness sensation. (see below)              -may take OTC meclizine as needed for dizziness or nausea.     Caution with use of Decongestant meds:  -Do not combine pseudophed or phenylephrine with any other brand allergy-D for DECONGESTANT.   -Or vice versa, you can you take plain allergy medications (allegra/claritin/zyrtec with NO Decongestant) and ADD OTC pseudophed or phenelyphrine 3 times a day (or every 4-6 hours needed). Avoid taking decongestant late at night or with caffeine as it can keep you up or cause jittery feeling.     -If you DO have Hypertension , anxiety, or palpitations, it is safe to take Coricidin HBP for relief of cough, congestion, or sinus symptoms every 4-6 hours.      For your GI symptoms:  -Use gatorade/pedialyte or rehydration packets to help stay hydrated. Vitamin water and plain water do not contain rehydrating electrolytes.  -Increase clear liquids (water, gatorade, pedialyte, broths, jello, etc). If nausea/vomiting/diarrhea, advance to BRAT diet (banana, rice, applesauce, tea, toast/crackers), then advance further to solid food as tolerated. Avoid spicy or fatty foods.   -Please go to the ER if you experience worsening abdominal pain, blood in your vomit or  stool, high fever, dizziness, fainting, swelling of your abdomen, inability to pass gas or stool, or inability to urinate.     -You must understand that you've received an Urgent Care treatment only and that you may be released before all your medical problems are known or treated. You, the patient, will arrange for follow up care as instructed. Please arrange follow up with your primary medical clinic within 2-5 days if your signs and symptoms have not resolved or worsen.     - Follow up with your PCP or specialty clinic as directed.  You can call [phone] or [phone] to schedule an appointment with the appropriate provider.  Schedule CENTER is open Mon-Friday 8-5pm (excluded holidays).    - If your condition worsens or fails to improve we recommend that you receive another evaluation at the emergency room immediately or contact your primary medical clinic to discuss your concerns.        Prevention steps for patients with confirmed or suspected COVID-19  Stay home and stay away from family members and friends. The CDC says, you can leave home after these three things have happened: 1) You have had no fever for at least 24 hours (that is one full day of no fever without the use of medicine that reduces fevers) BUT MUST WEAR A SURGICAL MASKS IN PUBLIC FOR 5 days passed from first positive test.  Separate yourself from other people and animals in your home.  Call ahead before visiting your doctor.  Wear a facemask.  Cover your coughs and sneezes.  Wash your hands often with soap and water; hand  can be used, too.  Avoid sharing personal household items.  Wipe down surfaces used daily.  Monitor your symptoms. Seek prompt medical attention if your illness is worsening (e.g., difficulty breathing).   Before seeking care, call your healthcare provider.  If you have a medical emergency and need to call 911, notify the dispatch personnel that you have, or are being evaluated for COVID-19. If possible,  put on a facemask before emergency medical services arrive.      Recommended precautions for household members, intimate partners, and caregivers in a home setting of a patient with symptomatic laboratory-confirmed COVID-19 or a patient under investigation.  Household members, intimate partners, and caregivers in the home setting awaiting tests results have close contact with a person with symptomatic, laboratory-confirmed COVID-19 or a person under investigation. Close contacts should monitor their health; they should call their provider right away if they develop symptoms suggestive of COVID-19 (e.g., fever, cough, shortness of breath).    Close contacts should also follow these recommendations:  Make sure that you understand and can help the patient follow their provider's instructions for medication(s) and care. You should help the patient with basic needs in the home and provide support for getting groceries, prescriptions, and other personal needs.  Monitor the patient's symptoms. If the patient is getting sicker, call his or her healthcare provider and tell them that the patient has laboratory-confirmed COVID-19. If the patient has a medical emergency and you need to call 911, notify the dispatch personnel that the patient has, or is being evaluated for COVID-19.  Household members should stay in another room or be  from the patient. Household members should use a separate bedroom and bathroom, if available.  Prohibit visitors.  Household members should care for any pets in the home.  Make sure that shared spaces in the home have good air flow, such as by an air conditioner or an opened window, weather permitting.  Perform hand hygiene frequently. Wash your hands often with soap and water for at least 20 seconds or use an alcohol-based hand  (that contains > 60% alcohol) covering all surfaces of your hands and rubbing them together until they feel dry. Soap and water should be used  preferentially.  Avoid touching your eyes, nose, and mouth.  The patient should wear a facemask. If the patient is not able to wear a facemask (for example, because it causes trouble breathing), caregivers should wear a mask when they are in the same room as the patient.  Wear a disposable facemask and gloves when you touch or have contact with the patient's blood, stool, or body fluids, such as saliva, sputum, nasal mucus, vomit, urine.  Throw out disposable facemasks and gloves after using them. Do not reuse.  When removing personal protective equipment, first remove and dispose of gloves. Then, immediately clean your hands with soap and water or alcohol-based hand . Next, remove and dispose of facemask, and immediately clean your hands again with soap and water or alcohol-based hand .  You should not share dishes, drinking glasses, cups, eating utensils, towels, bedding, or other items with the patient. After the patient uses these items, you should wash them thoroughly (see below Wash laundry thoroughly).  Clean all high-touch surfaces, such as counters, tabletops, doorknobs, bathroom fixtures, toilets, phones, keyboards, tablets, and bedside tables, every day. Also, clean any surfaces that may have blood, stool, or body fluids on them.  Use a household cleaning spray or wipe, according to the label instructions. Labels contain instructions for safe and effective use of the cleaning product including precautions you should take when applying the product, such as wearing gloves and making sure you have good ventilation during use of the product.  Wash laundry thoroughly.  Immediately remove and wash clothes or bedding that have blood, stool, or body fluids on them.  Wear disposable gloves while handling soiled items and keep soiled items away from your body. Clean your hands (with soap and water or an alcohol-based hand ) immediately after removing your gloves.  Read and follow  directions on labels of laundry or clothing items and detergent. In general, using a normal laundry detergent according to washing machine instructions and dry thoroughly using the warmest temperatures recommended on the clothing label.  Place all used disposable gloves, facemasks, and other contaminated items in a lined container before disposing of them with other household waste. Clean your hands (with soap and water or an alcohol-based hand ) immediately after handling these items. Soap and water should be used preferentially if hands are visibly dirty.  Discuss any additional questions with your state or local health department or healthcare provider. Check available hours when contacting your local health department.    For more information see CDC link below.      https://www.cdc.gov/coronavirus/2019-ncov/hcp/guidance-prevent-spread.html#precautions        Sources:  [facility], [place] [facility]          Instructions for Home Care of Patients and Caretakers with Coronavirus Disease 2019  Limit visitors to the home.  Older persons and those that have chronic medical conditions such as diabetes, lung and heart disease are at increased risk for illness.   If possible, patients should use a separate bedroom while recovering. Caregivers and household members should avoid prolonged contact with the patient which means to stay 6 feet away and avoid contact with cough droplets.  When close contact is necessary, wash your hands before and immediately after contact.   Perform hand hygiene frequently. Wash your hands often with soap and water for at least 20 seconds or use an alcohol-based hand , covering all surfaces of your hands and rubbing them together until they feel dry.   Avoid touching your eyes, nose, and mouth with unwashed hands.  Avoid sharing household items with the patient. You should not share dishes, drinking glasses, cups, eating utensils, towels, bedding, or  other items. After the patient uses these items, you should wash them thoroughly.  Wash laundry thoroughly.   Immediately remove and wash clothes or bedding that have blood, stool, or body fluids on them.  Clean all high-touch surfaces, such as counters, tabletops, doorknobs, bathroom fixtures, toilets, phones, keyboards, tablets, and bedside tables, every day.   Use a household cleaning spray or wipe, according to the label instructions. Labels contain instructions for safe and effective use of the cleaning product including precautions you should take when applying the product, such as wearing gloves and making sure you have good ventilation during use of the product.    For more information see CDC link below.      https://www.cdc.gov/coronavirus/2019-ncov/hcp/guidance-prevent-spread.html#precautions               If your symptoms worsen or if you have any other concerns, please contact [facility] at [phone].

## 2024-04-21 NOTE — PROGRESS NOTES
"Subjective:      Patient ID: Estefani Sutherland is a 59 y.o. female.    Vitals:  height is 5' 3" (1.6 m) and weight is 74.8 kg (165 lb). Her oral temperature is 97.9 °F (36.6 °C). Her blood pressure is 104/69 and her pulse is 75. Her respiration is 16 and oxygen saturation is 96%.     Chief Complaint: Cough    59-year-old female with a history of heart failure, coronary artery disease, hypertension, hyperlipidemia, diabetes on insulin, previous CVA (L pontine stroke in 1/2021)on aspirin 81 mg CKD stage 3, vision impairment, GERD, and other comorbidities who presents to urgent care clinic and wheelchair with significant other for evaluation.  Reports symptoms started 3 days ago and returned home from [facility] [employer] one-week ago Monday.  Complaining of productive cough, nasal/sinus congestion, ear congestion, runny nose, postnasal drip, sore throat.  Has not been taking anything for symptoms.  Requesting antibiotic treatment and sugar free cough syrup given her diabetes.      Medical assistant note:  Patient reports productive cough started 3 days ago.Patient reports she just got off of a cruise.Patient reports she is a diabetic and nedds sugar free cough medicine.Patient took noting for her symptoms.PATIENT REPORTS SHE ONLY WANTS WRITTEN PRESCRIPTION.PATIENT IS BLIND.    Cough  This is a new problem. The current episode started in the past 7 days (3 days). The problem has been gradually worsening. The problem occurs every few minutes. The cough is Productive of sputum. Associated symptoms include ear congestion, nasal congestion, postnasal drip, rhinorrhea and a sore throat. Pertinent negatives include no chest pain, chills, ear pain, fever, headaches, heartburn, hemoptysis, myalgias, rash, shortness of breath or wheezing. The symptoms are aggravated by lying down. She has tried nothing for the symptoms. Her past medical history is significant for bronchitis. There is no history of asthma.       Constitution: Negative " for activity change, appetite change, chills, sweating, fatigue, fever and generalized weakness.   HENT:  Positive for congestion, postnasal drip, sinus pain, sinus pressure and sore throat. Negative for ear pain, hearing loss, facial swelling, trouble swallowing and voice change.    Neck: Negative for neck pain, neck stiffness and painful lymph nodes.   Cardiovascular:  Negative for chest pain, leg swelling, palpitations, sob on exertion and passing out.   Eyes:  Negative for eye discharge, eye pain, photophobia, vision loss, double vision and blurred vision.   Respiratory:  Positive for cough and sputum production. Negative for chest tightness, bloody sputum, COPD, shortness of breath, stridor, wheezing and asthma.    Gastrointestinal:  Negative for abdominal pain, nausea, vomiting, constipation, diarrhea, bright red blood in stool, rectal bleeding, heartburn and bowel incontinence.   Genitourinary:  Negative for dysuria, frequency, urgency, urine decreased, flank pain, bladder incontinence and hematuria.   Musculoskeletal:  Negative for trauma, joint pain, joint swelling, abnormal ROM of joint, muscle cramps and muscle ache.   Skin:  Negative for color change, pale, rash and wound.   Allergic/Immunologic: Negative for seasonal allergies, asthma and immunocompromised state.   Neurological:  Negative for dizziness, history of vertigo, light-headedness, passing out, facial drooping, speech difficulty, coordination disturbances, loss of balance, headaches, disorientation, altered mental status, loss of consciousness, numbness, tingling and seizures.   Hematologic/Lymphatic: Negative for swollen lymph nodes, easy bruising/bleeding and trouble clotting. Does not bruise/bleed easily.   Psychiatric/Behavioral:  Negative for altered mental status and disorientation.       Objective:     Physical Exam   Constitutional: She is oriented to person, place, and time. She appears well-developed. She is cooperative. She does not  appear ill. No distress.   HENT:   Head: Normocephalic.   Ears:   Right Ear: Hearing, external ear and ear canal normal. No no drainage, swelling or tenderness. No mastoid tenderness.   Left Ear: Hearing, tympanic membrane, external ear and ear canal normal. No no drainage, swelling or tenderness. No mastoid tenderness.   Nose: Rhinorrhea and congestion present. Right sinus exhibits no maxillary sinus tenderness and no frontal sinus tenderness. Left sinus exhibits no maxillary sinus tenderness and no frontal sinus tenderness.   Mouth/Throat: Uvula is midline, oropharynx is clear and moist and mucous membranes are normal. Mucous membranes are moist. No oral lesions. No trismus in the jaw. No uvula swelling. No oropharyngeal exudate, posterior oropharyngeal edema, posterior oropharyngeal erythema or tonsillar abscesses. No tonsillar exudate. Oropharynx is clear.   Eyes: Conjunctivae, EOM and lids are normal. Right eye exhibits no discharge. Left eye exhibits no discharge. Right conjunctiva is not injected. Right conjunctiva has no hemorrhage. Left conjunctiva is not injected. Left conjunctiva has no hemorrhage. Extraocular movement intact vision grossly intact gaze aligned appropriately   Neck: Phonation normal. Neck supple. No neck rigidity present.   Cardiovascular: Normal rate, regular rhythm, normal heart sounds and normal pulses.   No murmur heard.  Pulmonary/Chest: Effort normal and breath sounds normal. No accessory muscle usage. No respiratory distress. She has no wheezes. She exhibits no tenderness.   Abdominal: Normal appearance. She exhibits no distension. Soft. There is no abdominal tenderness. There is no rebound and no guarding.   Musculoskeletal: Normal range of motion.         General: Normal range of motion.      Right lower leg: No edema.      Left lower leg: No edema.      Comments: Moves all extremities with normal tone, strength, and ROM.  Gait normal.   Lymphadenopathy:     She has no cervical  adenopathy.        Right cervical: No superficial cervical adenopathy present.       Left cervical: No superficial cervical adenopathy present.   Neurological: She is alert, oriented to person, place, and time and at baseline. She has normal sensation. She displays facial asymmetry and dysarthria. She exhibits normal muscle tone. GCS eye subscore is 4. GCS verbal subscore is 5. GCS motor subscore is 6.      Comments: Baseline left-sided weakness from previous stroke with mild expressive aphasia.  Bilateral eye blindness.  Presents in wheelchair at baseline   Skin: Skin is warm, dry and no rash. Capillary refill takes less than 2 seconds.   Psychiatric: She experiences Normal attention. Her speech is normal and behavior is normal. Thought content normal.   Nursing note and vitals reviewed.    Results for orders placed or performed in visit on 04/21/24   SARS Coronavirus 2 Antigen, POCT Manual Read   Result Value Ref Range    SARS Coronavirus 2 Antigen Negative Negative     Acceptable Yes          Assessment:     1. Upper respiratory infection with cough and congestion    2. Cough with congestion of paranasal sinus    3. Allergic rhinitis with postnasal drip    4. Encounter for screening for other viral diseases    5. Stage 3b chronic kidney disease      Note dictated with voice recognition software, please excuse any grammatical errors.    Patient presents with clinical exam findings and history consistent with above.  We discussed the differential diagnosis.    On exam, patient is nontoxic appearing and vitals are stable.  Patient is essentially neurovascularly intact on exam.      Diagnostic testing results were independently reviewed and interpreted, which were discussed in depth with patient.   Test ordered in clinic:  Covid antigen negative.  Additionally, previous progress notes/admissions/lab were reviewed and interpreted.  BMP outside facility 02/27/2024 with decreased kidney function and EGFR  41.  [facility] ED note 02/26/2024 reviewed  Ms. Sutherland is a 57 yo F with PMHx significant for HFrEF, HTN, T2DM, CKD, and CVA who presents for abdominal pain. On initial evaluation evaluation the patient was afebrile, hemodynamically stable, saturating appropriately on room air. Initial investigation was significant for Cr 1.92 (baseline around 1.4). She was admitted for management of CODI.  On admission, the patient received IVF. Repeat labs demonstrated improvement of her creatinine back to baseline. The patient felt symptomatically improved.   On day of discharge, the patient remained afebrile, hemodynamically stable. No changes were made to medications. The patient was instructed to follow up outpatient with her Primary Care Physician, Cardiologist, and Nephrologist, for which she verbalized understanding. All questions were answered and the patient was provided transportation home.   PCP outside facility progress note 07/17/2023 reviewed           Plan:     We did discuss the risk of morbidity without treatment is significant. We had shared medical decision making for patient's treatment and care.  Given extensive medical history, she was prescribed symptomatic treatment and Z-Guanako as she is increased risk for worsening.  She declined all symptomatic treatment except for antibiotics since he states that is all she needs.  I explained to her the importance of taking prescribed medication and OTC medication to treat her symptoms to worsening.  She requested printed prescription.      Upper respiratory infection with cough and congestion  -     cetirizine (ZYRTEC) 10 MG tablet; Take 1 tablet (10 mg total) by mouth daily as needed for Allergies or Rhinitis.  Dispense: 30 tablet; Refill: 0  -     benzonatate (TESSALON PERLES) 100 MG capsule; Take 2 capsules (200 mg total) by mouth 3 (three) times daily as needed for Cough.  Dispense: 30 capsule; Refill: 0  -     azelastine (ASTELIN) 137 mcg (0.1 %) nasal spray; 1 spray (137  mcg total) by Nasal route 2 (two) times daily as needed for Rhinitis.  Dispense: 30 mL; Refill: 0  -     dextromethorphan-guaiFENesin (CORICIDIN HBP CHEST YVONNE-COUGH)  mg Cap; Take 1 tablet by mouth every 4 to 6 hours as needed (cough and congestion).  Dispense: 30 each; Refill: 0  -     azithromycin (Z-CANDIS) 250 MG tablet; Take 2 tablets by mouth on day 1; Take 1 tablet by mouth on days 2-5  Dispense: 6 tablet; Refill: 0    Cough with congestion of paranasal sinus  -     cetirizine (ZYRTEC) 10 MG tablet; Take 1 tablet (10 mg total) by mouth daily as needed for Allergies or Rhinitis.  Dispense: 30 tablet; Refill: 0  -     benzonatate (TESSALON PERLES) 100 MG capsule; Take 2 capsules (200 mg total) by mouth 3 (three) times daily as needed for Cough.  Dispense: 30 capsule; Refill: 0  -     azelastine (ASTELIN) 137 mcg (0.1 %) nasal spray; 1 spray (137 mcg total) by Nasal route 2 (two) times daily as needed for Rhinitis.  Dispense: 30 mL; Refill: 0  -     dextromethorphan-guaiFENesin (CORICIDIN HBP CHEST YVONNE-COUGH)  mg Cap; Take 1 tablet by mouth every 4 to 6 hours as needed (cough and congestion).  Dispense: 30 each; Refill: 0    Allergic rhinitis with postnasal drip  -     cetirizine (ZYRTEC) 10 MG tablet; Take 1 tablet (10 mg total) by mouth daily as needed for Allergies or Rhinitis.  Dispense: 30 tablet; Refill: 0  -     azelastine (ASTELIN) 137 mcg (0.1 %) nasal spray; 1 spray (137 mcg total) by Nasal route 2 (two) times daily as needed for Rhinitis.  Dispense: 30 mL; Refill: 0    Encounter for screening for other viral diseases  -     SARS Coronavirus 2 Antigen, POCT Manual Read    Stage 3b chronic kidney disease    -avoid anti-inflammatory given history of CKD.  Close follow up PCP.        Note dictated with voice recognition software, please excuse any grammatical errors.    We had shared decision making for patient's treatment. We discussed side effects/alternatives/benefits/risk and patient would  like to proceed with treatment plan. We also discussed other OTC treatment recommendations.    Patient was counseled, explained with the test results meaning, expected course, and answered all of questions. They can also receive results via my chart.      Patient was instructed to return for re-evaluation with urgent care or PCP for continued outpatient workup and management if symptoms do not improve/worsening symptoms. Strict ED versus clinic precautions given in depth.   Guideline, discharge and follow-up instructions given verbally/printed; patient will also receive via [facility]. Patient verbalized understanding and agreed with the entirety of plan of care.      Patient Instructions     PLEASE READ YOUR DISCHARGE INSTRUCTIONS ENTIRELY AS IT CONTAINS IMPORTANT INFORMATION.    Patient had covid testing done today.    Discussed corona virus precautions and reviewed [facility] FAC; printed a copy for patient.  I discussed to continue to monitor their symptoms. Discussed that if their symptoms persist or worsen to seek re-evaluation. Clinic vs. ER precautions were given.  Patient verbalized understanding and agreed with the entire plan of care.    If Negative and no direct exposure: symptom free without fever reducing meds in 24 hours - can go back to work in 24 hours.    - Reviewed radiographs and all diagnostic testing with patient/family.    - Rest.  Drink plenty of fluids.    - Tyleno as directed as needed for fever/pain.  For Tylenol, do not exceed 3000 mg/ day. If no contraindication or allergies.  -OK to supplement with OTC NyQuil at night as needed for cough and congestion.  Use caution of total amount of Tylenol/acetaminophen per day.  - take Tessalon as needed for cough suppression.   - continue albuterol inhaler as needed for shortness of breath/wheezing  - If you were prescribed antibiotics, please take them to completion. Please supplement with OTC probiotics and yogurt.  Contact clinic if develop profuse  diarrhea and weakness.    -Below are suggestions for symptomatic relief:              -Salt water gargles to soothe throat pain.              -Chloroseptic spray also helps to numb throat pain. Drink hot tea with honey or lemon to soothe your throat.              -Nasal saline spray reduces inflammation and dryness.              -Warm face compresses to help with facial sinus pain/pressure.              -Vicks vapor rub at night.              -Astepro/Azelastine NASAL SPRAY twice day for nasal/sinus congestion   **may also supplement with OTC nasal spray to help with inflammation and congestion.   Wean to off when you nose becomes to dry or bleed. Also use nasal saline twice a day to help with dryness.               -Flonase OTC or Nasacort OTC  once or twice a day for nasal/sinus congestion. DON'T USE IF YOU HAVE GLAUCOMA. CHECK WITH YOUR PHARMACIST/EYE PHYSICIAN.              -Simple foods like chicken noodle soup.              -Mucinex DM (ANY COUGH EXPECTORANT-- guaifenesin) for cough or chest congestion with mucus and (ANY COUGH SUPPRESSANT- dextromethorphan) helps with coughing every 12 hours. Mucinex-DM if you have chest congestion or sputum (caution if history of high blood pressure or palpitations).              -Zyrtec/Claritin/xyzal during the day time  & Benadryl at night (only if severe runny nose) may help with allergies and runny nose. Add decongestant if you have nasal/sinus congestion/sinus pressure/ear fullness sensation. (see below)              -may take OTC meclizine as needed for dizziness or nausea.     Caution with use of Decongestant meds:  -Do not combine pseudophed or phenylephrine with any other brand allergy-D for DECONGESTANT.   -Or vice versa, you can you take plain allergy medications (allegra/claritin/zyrtec with NO Decongestant) and ADD OTC pseudophed or phenelyphrine 3 times a day (or every 4-6 hours needed). Avoid taking decongestant late at night or with caffeine as it can keep  you up or cause jittery feeling.     -If you DO have Hypertension , anxiety, or palpitations, it is safe to take Coricidin HBP for relief of cough, congestion, or sinus symptoms every 4-6 hours.      For your GI symptoms:  -Use gatorade/pedialyte or rehydration packets to help stay hydrated. Vitamin water and plain water do not contain rehydrating electrolytes.  -Increase clear liquids (water, gatorade, pedialyte, broths, jello, etc). If nausea/vomiting/diarrhea, advance to BRAT diet (banana, rice, applesauce, tea, toast/crackers), then advance further to solid food as tolerated. Avoid spicy or fatty foods.   -Please go to the ER if you experience worsening abdominal pain, blood in your vomit or stool, high fever, dizziness, fainting, swelling of your abdomen, inability to pass gas or stool, or inability to urinate.     -You must understand that you've received an Urgent Care treatment only and that you may be released before all your medical problems are known or treated. You, the patient, will arrange for follow up care as instructed. Please arrange follow up with your primary medical clinic within 2-5 days if your signs and symptoms have not resolved or worsen.     - Follow up with your PCP or specialty clinic as directed.  You can call [phone] or [phone] to schedule an appointment with the appropriate provider.  Schedule CENTER is open Mon-Friday 8-5pm (excluded holidays).    - If your condition worsens or fails to improve we recommend that you receive another evaluation at the emergency room immediately or contact your primary medical clinic to discuss your concerns.        Prevention steps for patients with confirmed or suspected COVID-19  Stay home and stay away from family members and friends. The CDC says, you can leave home after these three things have happened: 1) You have had no fever for at least 24 hours (that is one full day of no fever without the use of medicine that reduces fevers) BUT  MUST WEAR A SURGICAL MASKS IN PUBLIC FOR 5 days passed from first positive test.  Separate yourself from other people and animals in your home.  Call ahead before visiting your doctor.  Wear a facemask.  Cover your coughs and sneezes.  Wash your hands often with soap and water; hand  can be used, too.  Avoid sharing personal household items.  Wipe down surfaces used daily.  Monitor your symptoms. Seek prompt medical attention if your illness is worsening (e.g., difficulty breathing).   Before seeking care, call your healthcare provider.  If you have a medical emergency and need to call 911, notify the dispatch personnel that you have, or are being evaluated for COVID-19. If possible, put on a facemask before emergency medical services arrive.      Recommended precautions for household members, intimate partners, and caregivers in a home setting of a patient with symptomatic laboratory-confirmed COVID-19 or a patient under investigation.  Household members, intimate partners, and caregivers in the home setting awaiting tests results have close contact with a person with symptomatic, laboratory-confirmed COVID-19 or a person under investigation. Close contacts should monitor their health; they should call their provider right away if they develop symptoms suggestive of COVID-19 (e.g., fever, cough, shortness of breath).    Close contacts should also follow these recommendations:  Make sure that you understand and can help the patient follow their provider's instructions for medication(s) and care. You should help the patient with basic needs in the home and provide support for getting groceries, prescriptions, and other personal needs.  Monitor the patient's symptoms. If the patient is getting sicker, call his or her healthcare provider and tell them that the patient has laboratory-confirmed COVID-19. If the patient has a medical emergency and you need to call 911, notify the dispatch personnel that the  patient has, or is being evaluated for COVID-19.  Household members should stay in another room or be  from the patient. Household members should use a separate bedroom and bathroom, if available.  Prohibit visitors.  Household members should care for any pets in the home.  Make sure that shared spaces in the home have good air flow, such as by an air conditioner or an opened window, weather permitting.  Perform hand hygiene frequently. Wash your hands often with soap and water for at least 20 seconds or use an alcohol-based hand  (that contains > 60% alcohol) covering all surfaces of your hands and rubbing them together until they feel dry. Soap and water should be used preferentially.  Avoid touching your eyes, nose, and mouth.  The patient should wear a facemask. If the patient is not able to wear a facemask (for example, because it causes trouble breathing), caregivers should wear a mask when they are in the same room as the patient.  Wear a disposable facemask and gloves when you touch or have contact with the patient's blood, stool, or body fluids, such as saliva, sputum, nasal mucus, vomit, urine.  Throw out disposable facemasks and gloves after using them. Do not reuse.  When removing personal protective equipment, first remove and dispose of gloves. Then, immediately clean your hands with soap and water or alcohol-based hand . Next, remove and dispose of facemask, and immediately clean your hands again with soap and water or alcohol-based hand .  You should not share dishes, drinking glasses, cups, eating utensils, towels, bedding, or other items with the patient. After the patient uses these items, you should wash them thoroughly (see below Wash laundry thoroughly).  Clean all high-touch surfaces, such as counters, tabletops, doorknobs, bathroom fixtures, toilets, phones, keyboards, tablets, and bedside tables, every day. Also, clean any surfaces that may have blood,  stool, or body fluids on them.  Use a household cleaning spray or wipe, according to the label instructions. Labels contain instructions for safe and effective use of the cleaning product including precautions you should take when applying the product, such as wearing gloves and making sure you have good ventilation during use of the product.  Wash laundry thoroughly.  Immediately remove and wash clothes or bedding that have blood, stool, or body fluids on them.  Wear disposable gloves while handling soiled items and keep soiled items away from your body. Clean your hands (with soap and water or an alcohol-based hand ) immediately after removing your gloves.  Read and follow directions on labels of laundry or clothing items and detergent. In general, using a normal laundry detergent according to washing machine instructions and dry thoroughly using the warmest temperatures recommended on the clothing label.  Place all used disposable gloves, facemasks, and other contaminated items in a lined container before disposing of them with other household waste. Clean your hands (with soap and water or an alcohol-based hand ) immediately after handling these items. Soap and water should be used preferentially if hands are visibly dirty.  Discuss any additional questions with your state or local health department or healthcare provider. Check available hours when contacting your local health department.    For more information see CDC link below.      https://www.cdc.gov/coronavirus/2019-ncov/hcp/guidance-prevent-spread.html#precautions        Sources:  CDC, [place] [facility]          Instructions for Home Care of Patients and Caretakers with Coronavirus Disease 2019  Limit visitors to the home.  Older persons and those that have chronic medical conditions such as diabetes, lung and heart disease are at increased risk for illness.   If possible, patients should use a separate  bedroom while recovering. Caregivers and household members should avoid prolonged contact with the patient which means to stay 6 feet away and avoid contact with cough droplets.  When close contact is necessary, wash your hands before and immediately after contact.   Perform hand hygiene frequently. Wash your hands often with soap and water for at least 20 seconds or use an alcohol-based hand , covering all surfaces of your hands and rubbing them together until they feel dry.   Avoid touching your eyes, nose, and mouth with unwashed hands.  Avoid sharing household items with the patient. You should not share dishes, drinking glasses, cups, eating utensils, towels, bedding, or other items. After the patient uses these items, you should wash them thoroughly.  Wash laundry thoroughly.   Immediately remove and wash clothes or bedding that have blood, stool, or body fluids on them.  Clean all high-touch surfaces, such as counters, tabletops, doorknobs, bathroom fixtures, toilets, phones, keyboards, tablets, and bedside tables, every day.   Use a household cleaning spray or wipe, according to the label instructions. Labels contain instructions for safe and effective use of the cleaning product including precautions you should take when applying the product, such as wearing gloves and making sure you have good ventilation during use of the product.    For more information see CDC link below.      https://www.cdc.gov/coronavirus/2019-ncov/hcp/guidance-prevent-spread.html#precautions               If your symptoms worsen or if you have any other concerns, please contact [facility] at [phone].               Additional MDM:     Heart Failure Score:   COPD = No

## 2024-07-21 ENCOUNTER — HOSPITAL ENCOUNTER (EMERGENCY)
Facility: OTHER | Age: 59
Discharge: HOME | End: 2024-07-21
Attending: EMERGENCY MEDICINE
Payer: MEDICARE

## 2024-07-21 VITALS
WEIGHT: 163.13 LBS | SYSTOLIC BLOOD PRESSURE: 133 MMHG | DIASTOLIC BLOOD PRESSURE: 76 MMHG | TEMPERATURE: 97 F | RESPIRATION RATE: 16 BRPM | OXYGEN SATURATION: 99 % | HEART RATE: 82 BPM | BODY MASS INDEX: 28.9 KG/M2

## 2024-07-21 DIAGNOSIS — R10.9 ABDOMINAL PAIN: ICD-10-CM

## 2024-07-21 DIAGNOSIS — K59.00 CONSTIPATION, UNSPECIFIED CONSTIPATION TYPE: Primary | ICD-10-CM

## 2024-07-21 PROCEDURE — 99283 EMERGENCY DEPT VISIT LOW MDM: CPT | Mod: 25

## 2024-07-21 PROCEDURE — 25000003 PHARM REV CODE 250

## 2024-07-21 RX ORDER — ALUMINUM HYDROXIDE, MAGNESIUM HYDROXIDE, AND SIMETHICONE 1200; 120; 1200 MG/30ML; MG/30ML; MG/30ML
30 SUSPENSION ORAL ONCE
Status: COMPLETED | OUTPATIENT
Start: 2024-07-21 | End: 2024-07-21

## 2024-07-21 RX ORDER — DICYCLOMINE HYDROCHLORIDE 10 MG/1
20 CAPSULE ORAL
Status: COMPLETED | OUTPATIENT
Start: 2024-07-21 | End: 2024-07-21

## 2024-07-21 RX ORDER — BISACODYL 5 MG
5 TABLET, DELAYED RELEASE (ENTERIC COATED) ORAL 2 TIMES DAILY PRN
Qty: 15 TABLET | Refills: 0 | Status: SHIPPED | OUTPATIENT
Start: 2024-07-21

## 2024-07-21 RX ORDER — LIDOCAINE HYDROCHLORIDE 20 MG/ML
15 SOLUTION OROPHARYNGEAL ONCE
Status: COMPLETED | OUTPATIENT
Start: 2024-07-21 | End: 2024-07-21

## 2024-07-21 RX ORDER — DICYCLOMINE HYDROCHLORIDE 20 MG/1
20 TABLET ORAL 2 TIMES DAILY
Qty: 20 TABLET | Refills: 0 | Status: SHIPPED | OUTPATIENT
Start: 2024-07-21 | End: 2024-08-20

## 2024-07-21 RX ADMIN — Medication 1 ENEMA: at 09:07

## 2024-07-21 RX ADMIN — LIDOCAINE HYDROCHLORIDE 15 ML: 20 SOLUTION ORAL at 07:07

## 2024-07-21 RX ADMIN — Medication 1 ENEMA: at 10:07

## 2024-07-21 RX ADMIN — DICYCLOMINE HYDROCHLORIDE 20 MG: 10 CAPSULE ORAL at 07:07

## 2024-07-21 RX ADMIN — ALUMINUM HYDROXIDE, MAGNESIUM HYDROXIDE, AND SIMETHICONE 30 ML: 1200; 120; 1200 SUSPENSION ORAL at 07:07

## 2024-07-22 NOTE — ED PROVIDER NOTES
Encounter Date: 7/21/2024       History     Chief Complaint   Patient presents with    Abdominal Pain     Pt presents via no ems with c/o intermittent abd cramping x 2 days. Pt reports constipation x 1 week but states she was able to have a small/hard bm yesterday after taking a laxative. Pt states she took another laxative today with no improvement.      This is a 59 y.o female with HTN, diabetic neuropathy, diabetic retinopathy, glaucoma, CKD, history of stroke with expressive aphasia who presents to the ED via EMS with complaints of generalized abdominal pain x2 days. She describes the pain as cramping and non-focal. Reports associated constipation for which she has been taking miralax. She has a small bowel movement yesterday but did not alleviate her symptoms. She is passing gas. No associated nausea or vomiting. Denies fever, urinary symptoms, hematochezia, rectal pain.    The history is provided by the patient and the EMS personnel.     Review of patient's allergies indicates:   Allergen Reactions    Corticosteroids (glucocorticoids) Other (See Comments)     Spasm    Prednisone Other (See Comments)     Muscle spasms     Past Medical History:   Diagnosis Date    Blind in both eyes     Diabetes mellitus type II     Diabetic neuropathy     Diabetic retinopathy     Diabetic retinopathy associated with type 2 diabetes mellitus     Glaucoma 01/2019    Hyperlipidemia     Hypertension     Neuromuscular disorder     neuropathy - feet and hands    Stroke     Ischemic R MCA 5/2015     Past Surgical History:   Procedure Laterality Date    BREAST SURGERY      breast reduction    HYSTERECTOMY      BSO     Family History   Problem Relation Name Age of Onset    Hypertension Mother      Diabetes Father      Hypertension Father       Social History     Tobacco Use    Smoking status: Never    Smokeless tobacco: Never   Substance Use Topics    Alcohol use: No    Drug use: No     Review of Systems  10 point ROS performed and  negative except as stated in HPI   Physical Exam     Initial Vitals [07/21/24 1928]   BP Pulse Resp Temp SpO2   133/76 82 16 97.1 °F (36.2 °C) 99 %      MAP       --         Physical Exam    Constitutional: She appears well-developed and well-nourished.  Non-toxic appearance. She does not appear ill. No distress.   HENT:   Head: Normocephalic and atraumatic.   Eyes: Conjunctivae are normal.   Neck: Neck supple.   Cardiovascular:  Normal rate and regular rhythm.           Pulmonary/Chest: Effort normal. No tachypnea. No respiratory distress.   Abdominal: Abdomen is soft and protuberant. Bowel sounds are normal. She exhibits no distension. There is generalized abdominal tenderness. There is no rebound, no guarding and no tenderness at McBurney's point.   Genitourinary:    Genitourinary Comments: JUSTYN performed with DEBBY Hernández at bedside. No fecal impaction. Good rectal tone without pain.     Musculoskeletal:      Cervical back: Neck supple.     Neurological: She is alert and oriented to person, place, and time.   Skin: Skin is warm, dry and intact.   Psychiatric: She has a normal mood and affect. Her speech is normal and behavior is normal.         ED Course   Procedures  Labs Reviewed - No data to display       Imaging Results              X-Ray Abdomen AP 1 View (KUB) (Final result)  Result time 07/21/24 20:39:07      Final result by Iraj Burnette MD (07/21/24 20:39:07)                   Impression:      Nonobstructive bowel gas pattern.  Additional evaluation, as clinically warranted.      Electronically signed by: Iraj Burnette MD  Date:    07/21/2024  Time:    20:39               Narrative:    EXAMINATION:  XR ABDOMEN AP 1 VIEW    CLINICAL HISTORY:  Unspecified abdominal pain    TECHNIQUE:  AP View(s) of the abdomen was performed.    COMPARISON:  CT scan of the abdomen pelvis dated 01/22/2016.    FINDINGS:  The visualized lung bases are unremarkable.  No pleural effusions or airspace opacity.    The stomach is  nondistended.  There is moderate colonic stool burden.  No significant distention of the bowel loops.    There are phleboliths in the pelvis.  No additional abnormal calcifications are present.    There are degenerative changes in the osseous structures.                                       Medications   sodium phosphates 19-7 gram/118 mL enema 1 enema (has no administration in time range)   dicyclomine capsule 20 mg (20 mg Oral Given 7/21/24 1957)   aluminum-magnesium hydroxide-simethicone 200-200-20 mg/5 mL suspension 30 mL (30 mLs Oral Given 7/21/24 1957)     And   LIDOcaine viscous HCl 2% oral solution 15 mL (15 mLs Oral Given 7/21/24 1957)   sodium phosphates 19-7 gram/118 mL enema 1 enema (1 enema Rectal Given 7/21/24 2138)     Medical Decision Making  Urgent evaluation of an afebrile 59 y.o with abdominal cramping and constipation. Her abdomen is soft and non-distended with generalized tenderness. No focal tenderness, rebound, or guarding. Patient refusing IV or blood work. Will obtain KUB as EMS reported constipation thought patient said she was not constipated. Will treat abdominal pain and reassess.    Differential diagnosis includes but is not limited to:  Constipation, colitis, gastroenteritis, fecal impaction    Amount and/or Complexity of Data Reviewed  Radiology: ordered.    Risk  OTC drugs.  Prescription drug management.               ED Course as of 07/21/24 2232   Sun Jul 21, 2024 2042 X-Ray Abdomen AP 1 View (KUB)  Nonobstructive bowel gas pattern. Moderate colonic stool burden. [FREDDY]   2102 Patient reports continued cramping. Will give fleet enema as I suspect moderate stool burden is causing her cramping. [FREDDY]   2228 Patient had bowel movement on bedside commode and reports improvement in cramping. States she still feels the urge to have a bowel movement and requested second enema which only produce mucous. JUSTYN performed with female chaperone at bedside did not reveal impaction. Discussed need  for continued stool softeners and miralax as well as increased water intake.  Counseled on need to follow up with PCP for further evaluation if symptoms persist.  Patient educated on signs and symptoms to monitor for and when to return to ED. Patient verbalized understanding agrees with treatment plan. All questions and concerns addressed. [FREDDY]      ED Course User Index  [FREDDY] Viry Pepper PA-C                       Clinical Impression:  Final diagnoses:  [R10.9] Abdominal pain  [K59.00] Constipation, unspecified constipation type (Primary)          ED Disposition Condition    Discharge Stable          ED Prescriptions       Medication Sig Dispense Start Date End Date Auth. Provider    dicyclomine (BENTYL) 20 mg tablet Take 1 tablet (20 mg total) by mouth 2 (two) times daily. 20 tablet 7/21/2024 8/20/2024 Viry ePpper PA-C    bisacodyL (DULCOLAX) 5 mg EC tablet Take 1 tablet (5 mg total) by mouth 2 (two) times daily as needed for Constipation. 15 tablet 7/21/2024 -- Viry Pepper PA-C          Follow-up Information       Follow up With Specialties Details Why Contact [facility] Schedule an appointment as soon as possible for a visit  As needed [address] [facility] [address]  [phone]      [facility] Emergency Dept Emergency Medicine Go to  If symptoms worsen [address] [facility] [id number] [phone]             Viry Pepper PA-C  07/21/24 4173

## 2024-07-30 ENCOUNTER — TELEPHONE (OUTPATIENT)
Dept: GASTROENTEROLOGY | Facility: CLINIC | Age: 59
End: 2024-07-30
Payer: MEDICARE

## 2024-07-30 NOTE — TELEPHONE ENCOUNTER
Spoke with patient. She is [facility] I explained to patient that we are not in network for [facility]

## 2024-07-30 NOTE — TELEPHONE ENCOUNTER
----- Message from Vamshi Borrego sent at 7/30/2024  7:16 AM CDT -----  Regarding: Appt  Contact: [phone]  Patient is calling to schedule an appointment no date in epic. Please contact pt